# Patient Record
Sex: FEMALE | Race: WHITE | NOT HISPANIC OR LATINO | Employment: OTHER | RURAL
[De-identification: names, ages, dates, MRNs, and addresses within clinical notes are randomized per-mention and may not be internally consistent; named-entity substitution may affect disease eponyms.]

---

## 2020-08-11 ENCOUNTER — HISTORICAL (OUTPATIENT)
Dept: ADMINISTRATIVE | Facility: HOSPITAL | Age: 35
End: 2020-08-11

## 2020-08-17 LAB
MAYO GENERIC ORDERABLE RESULT: NORMAL
MAYO INTERP AND REPORT: NORMAL

## 2020-08-21 ENCOUNTER — HISTORICAL (OUTPATIENT)
Dept: ADMINISTRATIVE | Facility: HOSPITAL | Age: 35
End: 2020-08-21

## 2021-04-06 ENCOUNTER — OFFICE VISIT (OUTPATIENT)
Dept: NEUROLOGY | Facility: CLINIC | Age: 36
End: 2021-04-06
Payer: MEDICARE

## 2021-04-06 VITALS
DIASTOLIC BLOOD PRESSURE: 64 MMHG | HEART RATE: 84 BPM | SYSTOLIC BLOOD PRESSURE: 106 MMHG | WEIGHT: 148.81 LBS | HEIGHT: 63 IN | BODY MASS INDEX: 26.37 KG/M2 | OXYGEN SATURATION: 97 %

## 2021-04-06 DIAGNOSIS — G43.119 INTRACTABLE MIGRAINE WITH AURA WITHOUT STATUS MIGRAINOSUS: Primary | ICD-10-CM

## 2021-04-06 DIAGNOSIS — Z79.899 ON LONG TERM DRUG THERAPY: ICD-10-CM

## 2021-04-06 DIAGNOSIS — G44.40 REBOUND HEADACHE: ICD-10-CM

## 2021-04-06 DIAGNOSIS — R90.89 ABNORMAL MRA, BRAIN: ICD-10-CM

## 2021-04-06 DIAGNOSIS — G47.30 SLEEP APNEA, UNSPECIFIED TYPE: ICD-10-CM

## 2021-04-06 PROBLEM — E55.9 VITAMIN D DEFICIENCY: Status: ACTIVE | Noted: 2021-04-06

## 2021-04-06 PROCEDURE — 99999 PR PBB SHADOW E&M-EST. PATIENT-LVL V: CPT | Mod: PBBFAC,,, | Performed by: NURSE PRACTITIONER

## 2021-04-06 PROCEDURE — 99999 PR PBB SHADOW E&M-EST. PATIENT-LVL V: ICD-10-PCS | Mod: PBBFAC,,, | Performed by: NURSE PRACTITIONER

## 2021-04-06 PROCEDURE — 99215 OFFICE O/P EST HI 40 MIN: CPT | Mod: PBBFAC | Performed by: NURSE PRACTITIONER

## 2021-04-06 PROCEDURE — 99214 OFFICE O/P EST MOD 30 MIN: CPT | Mod: S$PBB,,, | Performed by: NURSE PRACTITIONER

## 2021-04-06 PROCEDURE — 99214 PR OFFICE/OUTPT VISIT, EST, LEVL IV, 30-39 MIN: ICD-10-PCS | Mod: S$PBB,,, | Performed by: NURSE PRACTITIONER

## 2021-04-06 RX ORDER — HYDROXYZINE HYDROCHLORIDE 25 MG/1
25 TABLET, FILM COATED ORAL 2 TIMES DAILY
COMMUNITY
Start: 2021-03-24 | End: 2021-09-07 | Stop reason: SDUPTHER

## 2021-04-06 RX ORDER — LAMOTRIGINE 25 MG/1
TABLET ORAL
COMMUNITY
Start: 2021-01-28 | End: 2021-04-06

## 2021-04-06 RX ORDER — PROPRANOLOL HYDROCHLORIDE 80 MG/1
80 CAPSULE, EXTENDED RELEASE ORAL DAILY
COMMUNITY
Start: 2021-03-24 | End: 2021-06-02

## 2021-04-06 RX ORDER — LURASIDONE HYDROCHLORIDE 60 MG/1
60 TABLET, FILM COATED ORAL NIGHTLY
COMMUNITY
Start: 2021-03-24 | End: 2021-06-02 | Stop reason: ALTCHOICE

## 2021-04-06 RX ORDER — PROMETHAZINE HYDROCHLORIDE 25 MG/1
25 TABLET ORAL
COMMUNITY
End: 2021-04-06 | Stop reason: SDUPTHER

## 2021-04-06 RX ORDER — PROMETHAZINE HYDROCHLORIDE 25 MG/1
TABLET ORAL
Qty: 30 TABLET | Refills: 2 | Status: SHIPPED | OUTPATIENT
Start: 2021-04-06 | End: 2022-01-26 | Stop reason: SDUPTHER

## 2021-04-06 RX ORDER — MONTELUKAST SODIUM 10 MG/1
10 TABLET ORAL DAILY
COMMUNITY
Start: 2021-03-24 | End: 2021-06-02 | Stop reason: SDUPTHER

## 2021-04-06 RX ORDER — METHOCARBAMOL 500 MG/1
250 TABLET, FILM COATED ORAL DAILY PRN
COMMUNITY
Start: 2021-04-01 | End: 2021-06-02 | Stop reason: SDUPTHER

## 2021-04-06 RX ORDER — ESTRADIOL VALERATE 20 MG/ML
20 INJECTION INTRAMUSCULAR
COMMUNITY
Start: 2021-01-06

## 2021-04-07 ENCOUNTER — TELEPHONE (OUTPATIENT)
Dept: NEUROLOGY | Facility: CLINIC | Age: 36
End: 2021-04-07

## 2021-04-15 ENCOUNTER — HOSPITAL ENCOUNTER (OUTPATIENT)
Dept: RADIOLOGY | Facility: HOSPITAL | Age: 36
Discharge: HOME | End: 2021-04-15
Attending: NURSE PRACTITIONER
Payer: MEDICARE

## 2021-04-15 DIAGNOSIS — G43.119 INTRACTABLE MIGRAINE WITH AURA WITHOUT STATUS MIGRAINOSUS: ICD-10-CM

## 2021-04-15 DIAGNOSIS — R90.89 ABNORMAL MRA, BRAIN: ICD-10-CM

## 2021-04-15 PROCEDURE — 70496 CT ANGIOGRAPHY HEAD: CPT | Mod: TC

## 2021-04-15 PROCEDURE — 70496 CT ANGIOGRAPHY HEAD: CPT | Mod: 26,,,

## 2021-04-15 PROCEDURE — 25500020 PHARM REV CODE 255: Performed by: NURSE PRACTITIONER

## 2021-04-15 PROCEDURE — 70496 CTA HEAD: ICD-10-PCS | Mod: 26,,,

## 2021-04-15 RX ADMIN — IOPAMIDOL 100 ML: 755 INJECTION, SOLUTION INTRAVENOUS at 10:04

## 2021-04-21 DIAGNOSIS — G47.8 OTHER SLEEP DISORDERS: ICD-10-CM

## 2021-04-21 DIAGNOSIS — G47.10 HYPERSOMNIA, UNSPECIFIED: ICD-10-CM

## 2021-04-21 DIAGNOSIS — G47.30 SLEEP APNEA, UNSPECIFIED: Primary | ICD-10-CM

## 2021-06-02 ENCOUNTER — OFFICE VISIT (OUTPATIENT)
Dept: FAMILY MEDICINE | Facility: CLINIC | Age: 36
End: 2021-06-02
Payer: MEDICARE

## 2021-06-02 VITALS
DIASTOLIC BLOOD PRESSURE: 72 MMHG | HEART RATE: 97 BPM | OXYGEN SATURATION: 99 % | SYSTOLIC BLOOD PRESSURE: 134 MMHG | BODY MASS INDEX: 25.69 KG/M2 | HEIGHT: 63 IN | RESPIRATION RATE: 20 BRPM | WEIGHT: 145 LBS

## 2021-06-02 DIAGNOSIS — M79.10 MUSCLE PAIN: ICD-10-CM

## 2021-06-02 DIAGNOSIS — M32.9 LUPUS: ICD-10-CM

## 2021-06-02 DIAGNOSIS — J30.9 ALLERGIC RHINITIS, UNSPECIFIED SEASONALITY, UNSPECIFIED TRIGGER: Primary | ICD-10-CM

## 2021-06-02 DIAGNOSIS — F31.9 BIPOLAR AFFECTIVE DISORDER, REMISSION STATUS UNSPECIFIED: ICD-10-CM

## 2021-06-02 PROCEDURE — 86038 ANTINUCLEAR ANTIBODIES: CPT | Mod: ,,, | Performed by: CLINICAL MEDICAL LABORATORY

## 2021-06-02 PROCEDURE — 99214 OFFICE O/P EST MOD 30 MIN: CPT | Mod: ,,, | Performed by: FAMILY MEDICINE

## 2021-06-02 PROCEDURE — 86038 ANA EIA W/REFLEX DSDNA/ENA: ICD-10-PCS | Mod: ,,, | Performed by: CLINICAL MEDICAL LABORATORY

## 2021-06-02 PROCEDURE — 99214 PR OFFICE/OUTPT VISIT, EST, LEVL IV, 30-39 MIN: ICD-10-PCS | Mod: ,,, | Performed by: FAMILY MEDICINE

## 2021-06-02 RX ORDER — VORTIOXETINE 20 MG/1
TABLET, FILM COATED ORAL
COMMUNITY
Start: 2021-04-26 | End: 2021-06-02

## 2021-06-02 RX ORDER — MONTELUKAST SODIUM 10 MG/1
10 TABLET ORAL DAILY
Qty: 30 TABLET | Refills: 3 | Status: SHIPPED | OUTPATIENT
Start: 2021-06-02 | End: 2021-06-09 | Stop reason: SDUPTHER

## 2021-06-02 RX ORDER — METHOCARBAMOL 500 MG/1
500 TABLET, FILM COATED ORAL DAILY PRN
Qty: 30 TABLET | Refills: 3 | Status: SHIPPED | OUTPATIENT
Start: 2021-06-02 | End: 2021-06-09 | Stop reason: SDUPTHER

## 2021-06-02 RX ORDER — QUETIAPINE FUMARATE 50 MG/1
50 TABLET, FILM COATED ORAL 2 TIMES DAILY
Qty: 60 TABLET | Refills: 2 | Status: SHIPPED | OUTPATIENT
Start: 2021-06-02 | End: 2021-06-09 | Stop reason: SDUPTHER

## 2021-06-03 LAB — ANA SER QL: NEGATIVE

## 2021-07-29 ENCOUNTER — OFFICE VISIT (OUTPATIENT)
Dept: FAMILY MEDICINE | Facility: CLINIC | Age: 36
End: 2021-07-29
Payer: MEDICARE

## 2021-07-29 VITALS
WEIGHT: 145 LBS | BODY MASS INDEX: 25.69 KG/M2 | RESPIRATION RATE: 16 BRPM | HEIGHT: 63 IN | SYSTOLIC BLOOD PRESSURE: 113 MMHG | DIASTOLIC BLOOD PRESSURE: 87 MMHG | HEART RATE: 77 BPM

## 2021-07-29 DIAGNOSIS — M32.9 LUPUS: ICD-10-CM

## 2021-07-29 DIAGNOSIS — F41.9 ANXIETY: ICD-10-CM

## 2021-07-29 DIAGNOSIS — F31.70 BIPOLAR AFFECTIVE DISORDER IN REMISSION: ICD-10-CM

## 2021-07-29 DIAGNOSIS — Z23 NEED FOR PROPHYLACTIC VACCINATION WITH COMBINED DIPHTHERIA-TETANUS-PERTUSSIS (DTP) VACCINE: ICD-10-CM

## 2021-07-29 DIAGNOSIS — Z11.4 SCREENING FOR HUMAN IMMUNODEFICIENCY VIRUS: ICD-10-CM

## 2021-07-29 DIAGNOSIS — Z00.00 ENCOUNTER FOR SUBSEQUENT ANNUAL WELLNESS VISIT (AWV) IN MEDICARE PATIENT: Primary | ICD-10-CM

## 2021-07-29 DIAGNOSIS — Z23 NEED FOR VACCINATION FOR DTP: ICD-10-CM

## 2021-07-29 PROBLEM — I67.1 INTRACRANIAL ANEURYSM: Status: ACTIVE | Noted: 2021-05-12

## 2021-07-29 LAB — HIV 1+O+2 AB SERPL QL: NORMAL

## 2021-07-29 PROCEDURE — 1158F PR ADVANCE CARE PLANNING DISCUSS DOCUMENTED IN MEDICAL RECORD: ICD-10-PCS | Mod: ,,, | Performed by: NURSE PRACTITIONER

## 2021-07-29 PROCEDURE — 90471 TDAP VACCINE GREATER THAN OR EQUAL TO 7YO IM: ICD-10-PCS | Mod: ,,, | Performed by: NURSE PRACTITIONER

## 2021-07-29 PROCEDURE — 90471 IMMUNIZATION ADMIN: CPT | Mod: ,,, | Performed by: NURSE PRACTITIONER

## 2021-07-29 PROCEDURE — 87389 HIV 1 / 2 ANTIBODY: ICD-10-PCS | Mod: ,,, | Performed by: CLINICAL MEDICAL LABORATORY

## 2021-07-29 PROCEDURE — G0438 PR WELCOME MEDICARE ANNUAL WELLNESS INITIAL VISIT: ICD-10-PCS | Mod: ,,, | Performed by: NURSE PRACTITIONER

## 2021-07-29 PROCEDURE — 90715 TDAP VACCINE 7 YRS/> IM: CPT | Mod: ,,, | Performed by: NURSE PRACTITIONER

## 2021-07-29 PROCEDURE — G0438 PPPS, INITIAL VISIT: HCPCS | Mod: ,,, | Performed by: NURSE PRACTITIONER

## 2021-07-29 PROCEDURE — 1158F ADVNC CARE PLAN TLK DOCD: CPT | Mod: ,,, | Performed by: NURSE PRACTITIONER

## 2021-07-29 PROCEDURE — 90715 TDAP VACCINE GREATER THAN OR EQUAL TO 7YO IM: ICD-10-PCS | Mod: ,,, | Performed by: NURSE PRACTITIONER

## 2021-07-29 PROCEDURE — 87389 HIV-1 AG W/HIV-1&-2 AB AG IA: CPT | Mod: ,,, | Performed by: CLINICAL MEDICAL LABORATORY

## 2021-07-29 PROCEDURE — 1034F CURRENT TOBACCO SMOKER: CPT | Mod: ,,, | Performed by: NURSE PRACTITIONER

## 2021-07-29 PROCEDURE — 1034F PR CURRENT TOBACCO SMOKER: ICD-10-PCS | Mod: ,,, | Performed by: NURSE PRACTITIONER

## 2021-07-29 RX ORDER — PROPRANOLOL HYDROCHLORIDE 80 MG/1
1 CAPSULE, EXTENDED RELEASE ORAL DAILY
COMMUNITY
Start: 2021-07-27 | End: 2021-09-07

## 2021-07-29 RX ORDER — LURASIDONE HYDROCHLORIDE 60 MG/1
1 TABLET, FILM COATED ORAL DAILY
COMMUNITY
Start: 2021-06-26 | End: 2021-09-07

## 2021-09-07 ENCOUNTER — OFFICE VISIT (OUTPATIENT)
Dept: FAMILY MEDICINE | Facility: CLINIC | Age: 36
End: 2021-09-07
Payer: MEDICARE

## 2021-09-07 VITALS
HEART RATE: 84 BPM | SYSTOLIC BLOOD PRESSURE: 116 MMHG | DIASTOLIC BLOOD PRESSURE: 80 MMHG | HEIGHT: 63 IN | WEIGHT: 141 LBS | OXYGEN SATURATION: 99 % | BODY MASS INDEX: 24.98 KG/M2

## 2021-09-07 DIAGNOSIS — J30.9 ALLERGIC RHINITIS, UNSPECIFIED SEASONALITY, UNSPECIFIED TRIGGER: ICD-10-CM

## 2021-09-07 DIAGNOSIS — G47.00 INSOMNIA, UNSPECIFIED TYPE: ICD-10-CM

## 2021-09-07 DIAGNOSIS — F31.70 BIPOLAR AFFECTIVE DISORDER IN REMISSION: Primary | ICD-10-CM

## 2021-09-07 DIAGNOSIS — F41.9 ANXIETY: ICD-10-CM

## 2021-09-07 DIAGNOSIS — M79.10 MUSCLE PAIN: ICD-10-CM

## 2021-09-07 PROCEDURE — 99214 PR OFFICE/OUTPT VISIT, EST, LEVL IV, 30-39 MIN: ICD-10-PCS | Mod: ,,, | Performed by: FAMILY MEDICINE

## 2021-09-07 PROCEDURE — 99214 OFFICE O/P EST MOD 30 MIN: CPT | Mod: ,,, | Performed by: FAMILY MEDICINE

## 2021-09-07 RX ORDER — CARIPRAZINE 1.5 MG/1
1.5 CAPSULE, GELATIN COATED ORAL DAILY
Qty: 30 CAPSULE | Refills: 0 | Status: SHIPPED | OUTPATIENT
Start: 2021-09-07 | End: 2021-10-12 | Stop reason: SDUPTHER

## 2021-09-07 RX ORDER — MONTELUKAST SODIUM 10 MG/1
10 TABLET ORAL DAILY
Qty: 30 TABLET | Refills: 3 | Status: SHIPPED | OUTPATIENT
Start: 2021-09-07 | End: 2022-03-01 | Stop reason: SDUPTHER

## 2021-09-07 RX ORDER — TRAZODONE HYDROCHLORIDE 50 MG/1
50 TABLET ORAL NIGHTLY
Qty: 30 TABLET | Refills: 3 | Status: SHIPPED | OUTPATIENT
Start: 2021-09-07 | End: 2021-12-01 | Stop reason: SDUPTHER

## 2021-09-07 RX ORDER — METHOCARBAMOL 500 MG/1
500 TABLET, FILM COATED ORAL DAILY PRN
Qty: 30 TABLET | Refills: 3 | Status: SHIPPED | OUTPATIENT
Start: 2021-09-07 | End: 2021-12-01 | Stop reason: SDUPTHER

## 2021-09-07 RX ORDER — HYDROXYZINE HYDROCHLORIDE 25 MG/1
25 TABLET, FILM COATED ORAL 2 TIMES DAILY
Qty: 60 TABLET | Refills: 3 | Status: SHIPPED | OUTPATIENT
Start: 2021-09-07 | End: 2022-01-26

## 2021-09-07 RX ORDER — OMEPRAZOLE 20 MG/1
CAPSULE, DELAYED RELEASE ORAL
COMMUNITY
Start: 2021-08-17 | End: 2021-12-01

## 2021-09-07 RX ORDER — MELOXICAM 15 MG/1
TABLET ORAL
COMMUNITY
Start: 2021-08-17 | End: 2022-01-26

## 2021-10-12 ENCOUNTER — OFFICE VISIT (OUTPATIENT)
Dept: FAMILY MEDICINE | Facility: CLINIC | Age: 36
End: 2021-10-12
Payer: MEDICARE

## 2021-10-12 VITALS
RESPIRATION RATE: 16 BRPM | HEIGHT: 63 IN | SYSTOLIC BLOOD PRESSURE: 118 MMHG | WEIGHT: 149 LBS | OXYGEN SATURATION: 98 % | DIASTOLIC BLOOD PRESSURE: 80 MMHG | HEART RATE: 86 BPM | BODY MASS INDEX: 26.4 KG/M2

## 2021-10-12 DIAGNOSIS — F31.70 BIPOLAR AFFECTIVE DISORDER IN REMISSION: ICD-10-CM

## 2021-10-12 PROCEDURE — 99213 PR OFFICE/OUTPT VISIT, EST, LEVL III, 20-29 MIN: ICD-10-PCS | Mod: ,,, | Performed by: FAMILY MEDICINE

## 2021-10-12 PROCEDURE — 99213 OFFICE O/P EST LOW 20 MIN: CPT | Mod: ,,, | Performed by: FAMILY MEDICINE

## 2021-10-12 RX ORDER — CARIPRAZINE 1.5 MG/1
1.5 CAPSULE, GELATIN COATED ORAL DAILY
Qty: 30 CAPSULE | Refills: 3 | Status: SHIPPED | OUTPATIENT
Start: 2021-10-12 | End: 2021-12-01 | Stop reason: SDUPTHER

## 2021-12-01 ENCOUNTER — OFFICE VISIT (OUTPATIENT)
Dept: FAMILY MEDICINE | Facility: CLINIC | Age: 36
End: 2021-12-01
Payer: MEDICARE

## 2021-12-01 VITALS
TEMPERATURE: 99 F | RESPIRATION RATE: 15 BRPM | WEIGHT: 150 LBS | HEART RATE: 83 BPM | OXYGEN SATURATION: 98 % | HEIGHT: 63 IN | DIASTOLIC BLOOD PRESSURE: 78 MMHG | BODY MASS INDEX: 26.58 KG/M2 | SYSTOLIC BLOOD PRESSURE: 122 MMHG

## 2021-12-01 DIAGNOSIS — J30.9 ALLERGIC RHINITIS, UNSPECIFIED SEASONALITY, UNSPECIFIED TRIGGER: ICD-10-CM

## 2021-12-01 DIAGNOSIS — M79.10 MUSCLE PAIN: ICD-10-CM

## 2021-12-01 DIAGNOSIS — G47.00 INSOMNIA, UNSPECIFIED TYPE: ICD-10-CM

## 2021-12-01 DIAGNOSIS — F31.70 BIPOLAR AFFECTIVE DISORDER IN REMISSION: Primary | ICD-10-CM

## 2021-12-01 PROCEDURE — 99214 PR OFFICE/OUTPT VISIT, EST, LEVL IV, 30-39 MIN: ICD-10-PCS | Mod: ,,, | Performed by: FAMILY MEDICINE

## 2021-12-01 PROCEDURE — 99214 OFFICE O/P EST MOD 30 MIN: CPT | Mod: ,,, | Performed by: FAMILY MEDICINE

## 2021-12-01 RX ORDER — MELOXICAM 15 MG/1
TABLET ORAL
COMMUNITY
Start: 2021-08-17 | End: 2022-01-26

## 2021-12-01 RX ORDER — QUETIAPINE FUMARATE 50 MG/1
50 TABLET, FILM COATED ORAL NIGHTLY
Qty: 30 TABLET | Refills: 3 | Status: SHIPPED | OUTPATIENT
Start: 2021-12-01 | End: 2021-12-01 | Stop reason: ALTCHOICE

## 2021-12-01 RX ORDER — MONTELUKAST SODIUM 10 MG/1
TABLET ORAL
COMMUNITY
End: 2021-12-01 | Stop reason: SDUPTHER

## 2021-12-01 RX ORDER — OMEPRAZOLE 20 MG/1
CAPSULE, DELAYED RELEASE ORAL
COMMUNITY
Start: 2021-08-17 | End: 2021-12-01 | Stop reason: SDUPTHER

## 2021-12-01 RX ORDER — GALCANEZUMAB 120 MG/ML
INJECTION, SOLUTION SUBCUTANEOUS
COMMUNITY
End: 2022-01-26 | Stop reason: SDUPTHER

## 2021-12-01 RX ORDER — PROPRANOLOL HYDROCHLORIDE 80 MG/1
TABLET ORAL
COMMUNITY
End: 2021-12-01 | Stop reason: ALTCHOICE

## 2021-12-01 RX ORDER — TRAZODONE HYDROCHLORIDE 50 MG/1
50 TABLET ORAL NIGHTLY
Qty: 30 TABLET | Refills: 3 | Status: SHIPPED | OUTPATIENT
Start: 2021-12-01 | End: 2022-03-01 | Stop reason: SDUPTHER

## 2021-12-01 RX ORDER — METHOCARBAMOL 500 MG/1
500 TABLET, FILM COATED ORAL DAILY PRN
Qty: 30 TABLET | Refills: 3 | Status: SHIPPED | OUTPATIENT
Start: 2021-12-01 | End: 2022-06-01

## 2021-12-01 RX ORDER — CARIPRAZINE 1.5 MG/1
1.5 CAPSULE, GELATIN COATED ORAL DAILY
Qty: 30 CAPSULE | Refills: 3 | Status: SHIPPED | OUTPATIENT
Start: 2021-12-01 | End: 2022-03-01 | Stop reason: SDUPTHER

## 2021-12-01 RX ORDER — GABAPENTIN 100 MG/1
CAPSULE ORAL
COMMUNITY
Start: 2021-11-22 | End: 2023-03-02

## 2021-12-01 RX ORDER — QUETIAPINE FUMARATE 50 MG/1
TABLET, FILM COATED ORAL
COMMUNITY
Start: 2021-11-22 | End: 2021-12-01 | Stop reason: SDUPTHER

## 2021-12-01 RX ORDER — HYDROXYZINE HYDROCHLORIDE 25 MG/ML
INJECTION, SOLUTION INTRAMUSCULAR
COMMUNITY
End: 2022-01-26

## 2021-12-01 RX ORDER — OMEPRAZOLE 20 MG/1
20 CAPSULE, DELAYED RELEASE ORAL DAILY
COMMUNITY
End: 2022-01-26
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
## 2022-01-26 ENCOUNTER — OFFICE VISIT (OUTPATIENT)
Dept: NEUROLOGY | Facility: CLINIC | Age: 37
End: 2022-01-26
Payer: MEDICARE

## 2022-01-26 VITALS
HEART RATE: 97 BPM | HEIGHT: 63 IN | OXYGEN SATURATION: 98 % | BODY MASS INDEX: 28.67 KG/M2 | DIASTOLIC BLOOD PRESSURE: 74 MMHG | SYSTOLIC BLOOD PRESSURE: 116 MMHG | WEIGHT: 161.81 LBS

## 2022-01-26 DIAGNOSIS — E55.9 VITAMIN D DEFICIENCY: ICD-10-CM

## 2022-01-26 DIAGNOSIS — I67.1 INTRACRANIAL ANEURYSM: Chronic | ICD-10-CM

## 2022-01-26 DIAGNOSIS — G47.30 SLEEP APNEA, UNSPECIFIED TYPE: ICD-10-CM

## 2022-01-26 DIAGNOSIS — G43.119 INTRACTABLE MIGRAINE WITH AURA WITHOUT STATUS MIGRAINOSUS: Primary | Chronic | ICD-10-CM

## 2022-01-26 PROBLEM — M19.90 OSTEOARTHRITIS: Status: ACTIVE | Noted: 2022-01-26

## 2022-01-26 PROBLEM — M32.9 SYSTEMIC LUPUS ERYTHEMATOSUS: Status: ACTIVE | Noted: 2022-01-26

## 2022-01-26 PROBLEM — E66.3 OVERWEIGHT: Status: ACTIVE | Noted: 2022-01-26

## 2022-01-26 PROBLEM — M25.562 PAIN IN LEFT KNEE: Status: ACTIVE | Noted: 2022-01-26

## 2022-01-26 PROBLEM — A83.3: Status: ACTIVE | Noted: 2022-01-26

## 2022-01-26 PROBLEM — M79.7 FIBROMYALGIA: Status: ACTIVE | Noted: 2022-01-26

## 2022-01-26 PROCEDURE — 99214 PR OFFICE/OUTPT VISIT, EST, LEVL IV, 30-39 MIN: ICD-10-PCS | Mod: S$PBB,,, | Performed by: NURSE PRACTITIONER

## 2022-01-26 PROCEDURE — 99214 OFFICE O/P EST MOD 30 MIN: CPT | Mod: PBBFAC | Performed by: NURSE PRACTITIONER

## 2022-01-26 PROCEDURE — 99214 OFFICE O/P EST MOD 30 MIN: CPT | Mod: S$PBB,,, | Performed by: NURSE PRACTITIONER

## 2022-01-26 RX ORDER — HYDROXYZINE PAMOATE 25 MG/1
CAPSULE ORAL
COMMUNITY
Start: 2022-01-17 | End: 2022-03-01 | Stop reason: SDUPTHER

## 2022-01-26 RX ORDER — PROGESTERONE 200 MG/1
CAPSULE ORAL
COMMUNITY
Start: 2022-01-17 | End: 2023-03-02

## 2022-01-26 RX ORDER — PROMETHAZINE HYDROCHLORIDE 25 MG/1
TABLET ORAL
Qty: 30 TABLET | Refills: 5 | Status: SHIPPED | OUTPATIENT
Start: 2022-01-26 | End: 2022-07-26 | Stop reason: SDUPTHER

## 2022-01-26 NOTE — PATIENT INSTRUCTIONS
"Patient Education       Brain Aneurysm   The Basics   Written by the doctors and editors at [facility]   What is a brain aneurysm? -- A brain aneurysm is a bulge or "ballooning" in the wall of a blood vessel in the brain (figure 1). A small aneurysm often causes no symptoms. But if it grows, an aneurysm can press on nerves inside your brain, causing pain and other symptoms. It can also leak blood or burst, causing a sudden, severe headache.  A burst aneurysm is a very serious, life-threatening emergency that needs to be treated right away. It leads to a bleeding (hemorrhagic) stroke. Stroke is the term doctors use when a part of the brain is damaged because of a problem with blood flow. The stroke that happens after a brain aneurysm bursts is called a "subarachnoid hemorrhage."  What are the symptoms of a brain aneurysm? -- Most small brain aneurysms cause no symptoms and are found during an imaging test (which a doctor does to look at pictures of a person's brain). For example, a person might get an imaging test because a family member had an aneurysm, or for an unrelated reason. But a large aneurysm can cause symptoms that include:  · A bad headache  · Pain in the face  · Blurry or double vision  If the aneurysm bursts, symptoms can include:   · A sudden, severe headache - People often say the headache is the worst they have ever had.  · A stiff neck  · Nausea and vomiting  · Passing out  Are there tests for a brain aneurysm? -- Yes. If you have symptoms of a brain aneurysm, or if you are at risk of having one, your doctor might do several different tests, including:  · Imaging tests - These tests include a CT scan and an MRI. Both show pictures of your brain. During these tests, you might also get an injection of a dye that makes it easier for doctors to see blood flow in the brain.  · Lumbar puncture (sometimes called a "spinal tap") - During this procedure, a doctor puts a needle into your lower back and takes " out a small sample of spinal fluid. Spinal fluid is the fluid that surrounds the brain and spinal cord. If this fluid has more red blood cells than usual, you could have a subarachnoid hemorrhage.  · Cerebral angiogram - For this test, your doctor puts a thin, plastic tube into a large blood vessel, usually near the top of your thigh. Then they will advance the tube through your blood vessels past your heart to your brain. Your doctor then injects dye that shows up on an X-ray. This lets the doctor see the blood vessels in your brain and find the aneurysm.  How is a brain aneurysm treated? -- There are 2 main ways to treat a brain aneurysm:  · Endovascular coiling - The first part of this treatment is like a cerebral angiogram (see above). But instead of injecting dye into the tube, your doctor advances a soft wire up through your blood vessels into the aneurysm. The wire coils up inside the aneurysm and seals it off from the blood vessel.  · Surgical clipping - In this surgery, your doctor puts a tiny metal clip on the base of the aneurysm to stop the blood flow to it.  If your aneurysm is leaking or bursts, your doctor will do one of these treatments right away. If it has not burst, you might not be treated. The decision depends on the size of the aneurysm, your age, and whether you have other health problems.   Whether or not your aneurysm is treated, your doctor will likely do more imaging tests over time. This is to make sure the aneurysm isn't growing or coming back, and that new aneurysms aren't forming.  Are there things that make people more likely to have a brain aneurysm? -- Yes. Having a parent, brother, or sister who had an aneurysm makes you more likely to have one. People with certain medical conditions also have a higher risk of aneurysm than other people.  Other things that can raise your chances include:  · Having high blood pressure  · Smoking cigarettes  · Drinking too much alcohol  · Using  "illegal drugs, such as cocaine or amphetamines  Avoiding these things might help lower your chance of getting an aneurysm.  All topics are updated as new evidence becomes available and our peer review process is complete.  This topic retrieved from [employer] on: Sep 21, 2021.  Topic 83255 Version 8.0  Release: 29.4.2 - C29.263  © 2021 UpToDate, Inc. and/or its affiliates. All rights reserved.  figure 1: Brain aneurysm     A brain aneurysm is a swollen bulge or "ballooning" in the wall of a blood vessel in the brain.  Graphic 97083 Version 2.0    Consumer Information Use and Disclaimer   This information is not specific medical advice and does not replace information you receive from your health care provider. This is only a brief summary of general information. It does NOT include all information about conditions, illnesses, injuries, tests, procedures, treatments, therapies, discharge instructions or life-style choices that may apply to you. You must talk with your health care provider for complete information about your health and treatment options. This information should not be used to decide whether or not to accept your health care provider's advice, instructions or recommendations. Only your health care provider has the knowledge and training to provide advice that is right for you. The use of this information is governed by the [employer] End User License Agreement, available at https://www.[employer]/en/solutions/[employer]/about/rosangela.The use of [employer] content is governed by the [employer] Terms of Use. ©2021 UpToDate, Inc. All rights reserved.  Copyright   © 2021 UpToDate, Inc. and/or its affiliates. All rights reserved.  Patient Education       Migraines Discharge Instructions   About this topic   A migraine is a specific type of headache. All headaches are not migraines. A migraine is caused by abnormal brain activity. It may be due to widening or narrowing of the blood vessels in the head. It may last " for a couple of hours or even a few days.  There are two types of migraine headaches:  · Classic migraine or migraine with aura ? It often starts with some problems in your eyesight called auras. You may see spots, dots, or even zigzag lines. Some people will lose part of their vision. An aura is the signal that a migraine is coming. It is followed by a very bad headache on one side of the head. Noise, light, and other activities can make it worse. Sometimes, it comes with upset stomach and throwing up.  · Common migraine or migraine without aura ? This migraine happens without the signal that the headache is coming.  Migraines can be treated with different drugs. Some drugs treat the pain. Other drugs stop the brain activity that causes the migraine. If migraines happen often, drugs that prevent migraines can help. Migraines can be helped by sleep. Stress control like exercise, relaxation, and a regular routine of good nutrition and sleep are all helpful in preventing migraines. Lifestyle and dietary changes may also help you deal with a migraine.         What care is needed at home?   · Ask your doctor what you need to do when you go home. Make sure you ask questions if you do not understand what the doctor says. This way you will know what you need to do.  · Your doctor may give you drugs to help with the pain. Take them as ordered by your doctor.  · Your doctor may teach you some ways to help control your migraine like relaxation and exercises.  · Keep a diary about your headaches. Write down when your headache happens. Write down what you were doing before it happened. Write down any foods or drinks that you had in the last day. This will help you learn what might be causing your headaches. Then, you can learn how to avoid them.  · Place an ice pack or a bag of frozen peas wrapped in a towel over your head. Never put ice right on the skin. Do not leave the ice on more than 10 to 15 minutes at a time.  · Using  heat may also help. Try using a heating pad on the back of your neck. A warm shower or bath may also relax tight muscles.  · Avoid bright lights and noise. Rest in a quiet, dark room. Sleep may also help.  · Drink a caffeinated beverage. Be careful to not drink too much caffeine as this can cause other headaches.  · Do not make any big decisions until your migraine goes away.  · If certain drugs your doctor ordered trigger your migraine, your doctor may tell you to stop taking those drugs.  What follow-up care is needed?   · Your doctor may ask you to make visits to the office to check on your progress. Be sure to keep these visits.  · Your doctor may send you to a specialist called a neurologist.  · Your doctor may request that you get a brain MRI.  What drugs may be needed?   The doctor may order drugs to:  · Help with pain  · Relieve the migraine  · Prevent a migraine attack  · Treat an upset stomach and throwing up  · Treat a hormonal problem  Will physical activity be limited?   · Regular exercise can help prevent migraines. If exercise triggers your migraine, talk to your doctor.  · Do not drive or run machinery until your migraine goes away.  What changes to diet are needed?   · Do not skip or delay meals. Drink 6 to 8 glasses of water each day. This may help prevent migraines.  · Avoid foods that may trigger the attack. These may include processed, fermented, pickled, or marinated foods. Some of these are baked goods, chocolate, dairy products, nuts, onions, and peanut butter. Others are fruits like avocado, banana, or citrus fruit and meats like carroll, hot dogs, and cured meats.  · Limit caffeine intake. You will find that caffeine may help relieve pain. But, too much caffeine may also trigger an attack.  · Avoid drinking beer, wine, and mixed drinks (alcohol) if you think this is causing your migraines.  · Quit smoking. Smoking can worsen your migraine.  What problems could happen?   · Loss of work time or  missing school if migraines come often  · Low mood, worry  · Poor quality of life  · Migraines can slightly raise your chances of having a stroke   What can be done to prevent this health problem?   · Some drugs may help prevent migraines. Talk to your doctor about the drugs you may need to take.  · Keep a sleeping routine. Go to sleep and get up the same time every day.  · Be active. Exercise can reduce your risk of having migraines.  · Take note of the things that may trigger your migraine. Learning what they are is very important to help avoid an attack.  · Try to keep stress in your life low. Try relaxation exercises or meditation to lower stress.  When do I need to call the doctor?   · Signs of a bad reaction. These include very bad headache beyond the usual; speech, vision, motion problems or loss of balance; headaches are worse when lying down or headaches suddenly starts. Call for emergency help right away.  · Changes in migraine attacks  · Migraines that happen more often than 3 times a month  · You are not feeling better in 2 to 3 days or you are feeling worse  Teach Back: Helping You Understand   The Teach Back Method helps you understand the information we are giving you. After you talk with the staff, tell them in your own words what you learned. This helps to make sure the staff has described each thing clearly. It also helps to explain things that may have been confusing. Before going home, make sure you can do these:  · I can tell you about my condition.  · I can tell you what may help ease my pain.  · I can tell you what I will do if there is a change in my headaches.  Where can I learn more?   American Academy of Pediatrics  https://www.healthychildren.org/English/health-issues/conditions/head-neck-nervous-system/Pages/Migraine-Headaches-in-Children.aspx   NHS Choices  http://www.nhs.uk/Conditions/Migraine/Pages/Causes.aspx   Last Reviewed Date   2020-05-12  Consumer Information Use and Disclaimer  "  This information is not specific medical advice and does not replace information you receive from your health care provider. This is only a brief summary of general information. It does NOT include all information about conditions, illnesses, injuries, tests, procedures, treatments, therapies, discharge instructions or life-style choices that may apply to you. You must talk with your health care provider for complete information about your health and treatment options. This information should not be used to decide whether or not to accept your health care providers advice, instructions or recommendations. Only your health care provider has the knowledge and training to provide advice that is right for you.  Copyright   Copyright © 2021 UpToDate, Inc. and its affiliates and/or licensors. All rights reserved.  Patient Education       Migraines in Adults   The Basics   Written by the doctors and editors at [employer]   What are migraines? -- Migraines are a kind of headache that can also involve other symptoms. Migraines can affect both adults and children. They are more common in women than in men. Migraines often start off mild and then get worse.  What are the symptoms of migraines in adults? -- Symptoms can include:  · Headache - The headache gets worse over several hours and is usually throbbing. It often affects 1 side of the head.  · Nausea and sometimes vomiting  · Feeling sensitive to light and noise - Lying down in a quiet, dark room often helps.  · Aura - Some people have something called a migraine "aura." An aura is a symptom or feeling that happens before or during the migraine headache. Each person's aura is different, but in most cases the aura affects the vision. You might see flashing lights, bright spots, or zig-zag lines, or lose part of your vision. Or you might have numbness and tingling of the lips, lower face, and fingers of 1 hand. Some people hear sounds or have ringing in their ears as part " "of their aura. The aura usually lasts a few minutes to an hour and then goes away, but most often lasts 15 to 30 minutes.  Women who get migraines with aura usually cannot take birth control pills. That's because they might increase the risk of stroke.  Many people get other symptoms of migraine that happen several hours or even a day before the headache. Doctors call these "premonitory" or "prodromal" symptoms. They might include yawning, feeling depressed, irritability, food cravings, constipation, or a stiff neck.  Is there a test for migraines? -- No. There is no test. But your doctor should be able to tell if you have migraines by doing an exam and learning about your symptoms.  Should I see a doctor or nurse? -- Yes. If you think you are having migraines, you should talk to your doctor or nurse. You should also see a doctor or nurse if your migraines get worse or more frequent, or if you have new symptoms.  Is there anything I can do to prevent migraines? -- Yes. Some people find that their migraines are triggered by certain things. If you can avoid some of these things, you can lower your chances of getting migraines.  You can also keep a "headache calendar." In the calendar, write down every time you have a migraine and what you ate and did before it started. That way you can find out if there is anything you should avoid eating or doing. You can also write down what medicine you took and whether or not it helped.  Common migraine triggers include:  · Stress  · Hormonal changes  · Skipping meals or not eating enough  · Changes in the weather  · Sleeping too much or too little  · Bright or flashing lights  · Drinking alcohol  · Certain drinks or foods, such as red wine, aged cheese, and hot dogs  If your migraines are frequent or severe, your doctor can suggest others ways to help prevent them. For example, it might help to learn relaxation techniques and ways to manage stress. There are also medicines that " can help.  Some women get migraines just before or during their period. Medicine can help with this, too.  How are migraines treated? -- There are many different medicines that can help with migraines. Your doctor can help you find the best treatment for your situation.  For mild migraines, your doctor might suggest an over-the-counter medicine such as acetaminophen (sample brand name: Tylenol), ibuprofen (sample brand names: Advil, Motrin), or naproxen (sample brand name: Aleve). There is also a medicine that combines acetaminophen, aspirin, and caffeine (sample brand name: Excedrin).  For more severe migraines, there are prescription medicines that can help. Some, such as medicines called triptans, help to relieve the pain from a migraine attack. Other prescription medicines can help to make migraine attacks happen less often. If you have severe nausea or vomiting with your migraines, there are medicines that can help with that, too.   Do not try to treat frequent migraines on your own with non-prescription pain medicines. Taking non-prescription pain medicines too often can actually cause more headaches later.  What if I want to get pregnant? -- If you want to get pregnant, talk to your doctor or nurse about it before you start trying. Some medicines used to treat and prevent migraines are not safe during pregnancy, so you might need to switch medicines before you get pregnant.  Some women notice that their migraines actually get better during pregnancy and breastfeeding. This is related to hormonal changes in the body.  All topics are updated as new evidence becomes available and our peer review process is complete.  This topic retrieved from [employer] on: Sep 21, 2021.  Topic 179161 Version 5.0  Release: 29.4.2 - C29.263  © 2021 UpToDate, Inc. and/or its affiliates. All rights reserved.  Consumer Information Use and Disclaimer   This information is not specific medical advice and does not replace information you  "receive from your health care provider. This is only a brief summary of general information. It does NOT include all information about conditions, illnesses, injuries, tests, procedures, treatments, therapies, discharge instructions or life-style choices that may apply to you. You must talk with your health care provider for complete information about your health and treatment options. This information should not be used to decide whether or not to accept your health care provider's advice, instructions or recommendations. Only your health care provider has the knowledge and training to provide advice that is right for you. The use of this information is governed by the [employer] End User License Agreement, available at https://www.[employer]/en/solutions/[employer]/about/rosangela.The use of [employer] content is governed by the [employer] Terms of Use. ©2021 UpToDate, Inc. All rights reserved.  Copyright   © 2021 UpToDate, Inc. and/or its affiliates. All rights reserved.  Patient Education       Sleep Apnea in Adults   The Basics   Written by the doctors and editors at [email or web address]   What is sleep apnea? -- Sleep apnea is a condition that makes you stop breathing for short periods while you are asleep. There are 2 types of sleep apnea. One is called "obstructive sleep apnea," and the other is called "central sleep apnea."  In obstructive sleep apnea, you stop breathing because your throat narrows or closes (figure 1). In central sleep apnea, you stop breathing because your brain does not send the right signals to your muscles to make you breathe. When people talk about sleep apnea, they are usually referring to obstructive sleep apnea, which is what this article is about.  People with sleep apnea do not know that they stop breathing when they are asleep. But they do sometimes wake up startled or gasping for breath. They also often hear from loved ones that they snore.  What are the symptoms of sleep apnea? -- The main " "symptoms of sleep apnea are loud snoring, tiredness, and daytime sleepiness. Other symptoms can include:  · Restless sleep  · Waking up choking or gasping  · Morning headaches, dry mouth, or sore throat  · Waking up often to urinate  · Waking up feeling unrested or groggy  · Trouble thinking clearly or remembering things  Some people with sleep apnea don't have symptoms, or they don't know they have them. They might figure that it's normal to be tired or to snore a lot.  Should I see a doctor or nurse? -- Yes. If you think you might have sleep apnea, see your doctor.  Is there a test for sleep apnea? -- Yes. If your doctor or nurse suspects you have sleep apnea, they might send you for a "sleep study." Sleep studies can sometimes be done at home, but they are usually done in a sleep lab. For the study, you spend the night in the lab, and you are hooked up to different machines that monitor your heart rate, breathing, and other body functions. The results of the test will tell your doctor or nurse if you have the disorder.  Is there anything I can do on my own to help my sleep apnea? -- Yes. Here are some things that might help:  · Stay off your back when sleeping. (This is not always practical, because people cannot control their position while asleep. Plus, it only helps some people.)  · Lose weight, if you are overweight  · Avoid alcohol, because it can make sleep apnea worse  How is sleep apnea treated? -- As mentioned above, weight loss can help if you are overweight or obese. But losing weight can be challenging, and it takes time to lose enough weight to help with your sleep apnea. Most people need other treatment while they work on losing weight.  The most effective treatment for sleep apnea is a device that keeps your airway open while you sleep. Treatment with this device is called "continuous positive airway pressure," or CPAP. People getting CPAP wear a face mask at night that keeps them breathing (figure " "2).  If your doctor or nurse recommends a CPAP machine, try to be patient about using it. The mask might seem uncomfortable to wear at first, and the machine might seem noisy, but using the machine can really pay off. People with sleep apnea who use a CPAP machine feel more rested and generally feel better.  There is also another device that you wear in your mouth called an "oral appliance" or "mandibular advancement device." It also helps keep your airway open while you sleep. But devices do not work as well as CPAP for treating sleep apnea.  In rare cases, when nothing else helps, doctors recommend surgery to keep the airway open. Surgery to do this is not always effective, and even when it is, the problem can come back.  Is sleep apnea dangerous? -- It can be. People with sleep apnea do not get good-quality sleep, so they are often tired and not alert. This puts them at risk for car accidents and other types of accidents. Plus, studies show that people with sleep apnea are more likely than others to have high blood pressure, heart attacks, and other serious heart problems. In people with severe sleep apnea, getting treated (for example, with a CPAP machine) can help prevent some of these problems.  All topics are updated as new evidence becomes available and our peer review process is complete.  This topic retrieved from [employer] on: Sep 21, 2021.  Topic 59930 Version 12.0  Release: 29.4.2 - C29.263  © 2021 UpToDate, Inc. and/or its affiliates. All rights reserved.  figure 1: Airway in a person with sleep apnea     Normally when a person sleeps, the airway remains open, and air can pass from the nose and mouth to the lungs. In a person with sleep apnea, parts of the throat and mouth drop into the airway and block off the flow of air. This can cause loud snoring and interrupt breathing for short periods.  Graphic 87844 Version 5.0    figure 2: Continuous positive airway pressure (CPAP) for sleep apnea     The CPAP " mask gently blows air into your nose while you sleep. It puts just enough pressure on your airway to keep it from closing. The mask in this picture fits over just the nose. Other CPAP devices have masks that fit over the nose and mouth.  Graphic 12506 Version 5.0    Consumer Information Use and Disclaimer   This information is not specific medical advice and does not replace information you receive from your health care provider. This is only a brief summary of general information. It does NOT include all information about conditions, illnesses, injuries, tests, procedures, treatments, therapies, discharge instructions or life-style choices that may apply to you. You must talk with your health care provider for complete information about your health and treatment options. This information should not be used to decide whether or not to accept your health care provider's advice, instructions or recommendations. Only your health care provider has the knowledge and training to provide advice that is right for you. The use of this information is governed by the [employer] End User License Agreement, available at https://www.[employer]/en/solutions/Enish/about/rosangela.The use of [employer] content is governed by the [employer] Terms of Use. ©2021 UpToDate, Inc. All rights reserved.  Copyright   © 2021 UpToDate, Inc. and/or its affiliates. All rights reserved.  Patient Education       Vitamin D Deficiency   The Basics   Written by the doctors and editors at [employer]   What is vitamin D deficiency? -- Vitamin D deficiency is when you do not have enough vitamin D in your body. This is a problem, because the body needs vitamin D to absorb calcium and for other important jobs.  People who do not have enough vitamin D can:  · Have weak or soft bones, which can break easily or change in shape  · Have weak muscles, which makes them more likely to fall  Is there a test for vitamin D deficiency? -- Yes. Your doctor or nurse can do  "a blood test to see if you have enough vitamin D. But you might not need this test. Doctors measure vitamin D levels only in people who are at risk for vitamin D deficiency. This includes people who:  · Spend most or all of their time indoors (for example, because they are in a nursing home)  · Have medical problems (such as celiac disease) that make it hard for them to absorb vitamin D  · Have a condition called "osteoporosis," which makes bones weak  · Broke a bone too easily, such as by falling down  What foods and drinks have vitamin D? -- Foods and drinks that have a lot of vitamin D include (figure 1):  · Milk, orange juice, or yogurt with vitamin D added  · [place] or mackerel  · Canned tuna fish  · Cereals with vitamin D added  · Cod liver oil  People's bodies can also get vitamin D from the sun. The body uses sunlight that shines on the skin to make vitamin D.  What are supplements? -- Supplements are pills, capsules, or liquids that have nutrients in them. Supplements are another way people can get vitamin D.  Do I need to take vitamin D supplements? -- Experts recommend that most adults take supplements that have 600 to 800 international units of vitamin D a day. This is the same as 15 to 20 micrograms of vitamin D. People who do not get enough vitamin D from their food or from the sun might need to take even more.  If your doctor recommends that you take vitamin D supplements, ask him or her which type, how much, and when to take the supplements.  The type and dose of supplement that is right for you will depend on your medical problems and the other medicines you take.  It is important not to take too much vitamin D. Taking too much vitamin D can make you sick.  Can I get extra vitamin D from the sun? -- It is not a good idea to try to get vitamin D by spending time in the sun or using tanning beds. This can lead to sunburn and increase your risk of skin cancer.  All topics are updated as new evidence " becomes available and our peer review process is complete.  This topic retrieved from [employer] on: Sep 21, 2021.  Topic 69746 Version 10.0  Release: 29.4.2 - C29.263  © 2021 UpToDate, Inc. and/or its affiliates. All rights reserved.  figure 1: Foods and drinks with vitamin D     Foods rich in vitamin D include milk, canned tuna fish, cod liver oil, yogurt, ready-to-eat-cereals, salmon, canned sardines, mackerel, and eggs.  Graphic 25507 Version 2.0    Consumer Information Use and Disclaimer   This information is not specific medical advice and does not replace information you receive from your health care provider. This is only a brief summary of general information. It does NOT include all information about conditions, illnesses, injuries, tests, procedures, treatments, therapies, discharge instructions or life-style choices that may apply to you. You must talk with your health care provider for complete information about your health and treatment options. This information should not be used to decide whether or not to accept your health care provider's advice, instructions or recommendations. Only your health care provider has the knowledge and training to provide advice that is right for you. The use of this information is governed by the [employer] End User License Agreement, available at https://www.[employer]/en/solutions/Revstr/about/rosangela.The use of [employer] content is governed by the [employer] Terms of Use. ©2021 UpToDate, Inc. All rights reserved.  Copyright   © 2021 UpToDate, Inc. and/or its affiliates. All rights reserved.    1. Keep follow up appointment with Neurosurgeon Dr. Badillo for management of aneurysm with MRA without contrast in 2025  2. Reschedule sleep study  3. Labs: CBC, CMP  4. Headache diary  5. Rx Aimovig 140mg/ml inject one ml subcutaneous monthly  6. Renew and continue phenergan 25 mg, no driving when taking this medication  7. Smoking cessation  8. Start vitamin d 1000 mcg one  daily over the counter  9. Keep regular follow ups with PCP  10. Keep regular follow up with Rheumatologist   11. Go to ER for any explosive type of headache or change in vision avoid constipation    12. Follow up with neurology in 6 months or sooner if needed

## 2022-01-27 ENCOUNTER — TELEPHONE (OUTPATIENT)
Dept: NEUROLOGY | Facility: CLINIC | Age: 37
End: 2022-01-27
Payer: MEDICARE

## 2022-01-27 NOTE — TELEPHONE ENCOUNTER
Pt voiced understanding  ----- Message from Jaya Leigh NP sent at 1/26/2022  1:20 PM CST -----  Please notify patient that her wbc are elevatd, rbc slightly low, and mcv is slightly elevated. She can follow up with PCP for these, other labs were good, thanks

## 2022-03-01 ENCOUNTER — OFFICE VISIT (OUTPATIENT)
Dept: FAMILY MEDICINE | Facility: CLINIC | Age: 37
End: 2022-03-01
Payer: MEDICARE

## 2022-03-01 VITALS
BODY MASS INDEX: 28.88 KG/M2 | WEIGHT: 163 LBS | TEMPERATURE: 97 F | SYSTOLIC BLOOD PRESSURE: 120 MMHG | HEART RATE: 90 BPM | OXYGEN SATURATION: 99 % | HEIGHT: 63 IN | RESPIRATION RATE: 18 BRPM | DIASTOLIC BLOOD PRESSURE: 64 MMHG

## 2022-03-01 DIAGNOSIS — G47.00 INSOMNIA, UNSPECIFIED TYPE: ICD-10-CM

## 2022-03-01 DIAGNOSIS — F31.70 BIPOLAR AFFECTIVE DISORDER IN REMISSION: Primary | ICD-10-CM

## 2022-03-01 DIAGNOSIS — J30.9 ALLERGIC RHINITIS, UNSPECIFIED SEASONALITY, UNSPECIFIED TRIGGER: ICD-10-CM

## 2022-03-01 DIAGNOSIS — D53.9 NUTRITIONAL ANEMIA: ICD-10-CM

## 2022-03-01 DIAGNOSIS — D72.829 LEUKOCYTOSIS, UNSPECIFIED TYPE: ICD-10-CM

## 2022-03-01 PROBLEM — G25.81 RESTLESS LEGS: Status: ACTIVE | Noted: 2022-03-01

## 2022-03-01 PROBLEM — M32.9 SYSTEMIC LUPUS ERYTHEMATOSUS: Status: RESOLVED | Noted: 2022-01-26 | Resolved: 2022-03-01

## 2022-03-01 LAB
BASOPHILS # BLD AUTO: 0.06 K/UL (ref 0–0.2)
BASOPHILS NFR BLD AUTO: 0.6 % (ref 0–1)
DIFFERENTIAL METHOD BLD: ABNORMAL
EOSINOPHIL # BLD AUTO: 0.25 K/UL (ref 0–0.5)
EOSINOPHIL NFR BLD AUTO: 2.3 % (ref 1–4)
ERYTHROCYTE [DISTWIDTH] IN BLOOD BY AUTOMATED COUNT: 13.5 % (ref 11.5–14.5)
FERRITIN SERPL-MCNC: 37 NG/ML (ref 8–252)
FOLATE SERPL-MCNC: 2.1 NG/ML (ref 3.1–17.5)
HCT VFR BLD AUTO: 40.6 % (ref 38–47)
HGB BLD-MCNC: 13.7 G/DL (ref 12–16)
IMM GRANULOCYTES # BLD AUTO: 0.03 K/UL (ref 0–0.04)
IMM GRANULOCYTES NFR BLD: 0.3 % (ref 0–0.4)
IRON SATN MFR SERPL: 18 % (ref 14–50)
IRON SERPL-MCNC: 67 ΜG/DL (ref 50–170)
LYMPHOCYTES # BLD AUTO: 2.48 K/UL (ref 1–4.8)
LYMPHOCYTES NFR BLD AUTO: 23.3 % (ref 27–41)
MCH RBC QN AUTO: 32.2 PG (ref 27–31)
MCHC RBC AUTO-ENTMCNC: 33.7 G/DL (ref 32–36)
MCV RBC AUTO: 95.3 FL (ref 80–96)
MONOCYTES # BLD AUTO: 0.71 K/UL (ref 0–0.8)
MONOCYTES NFR BLD AUTO: 6.7 % (ref 2–6)
MPC BLD CALC-MCNC: 9.1 FL (ref 9.4–12.4)
NEUTROPHILS # BLD AUTO: 7.13 K/UL (ref 1.8–7.7)
NEUTROPHILS NFR BLD AUTO: 66.8 % (ref 53–65)
NRBC # BLD AUTO: 0 X10E3/UL
NRBC, AUTO (.00): 0 %
PLATELET # BLD AUTO: 313 K/UL (ref 150–400)
RBC # BLD AUTO: 4.26 M/UL (ref 4.2–5.4)
TIBC SERPL-MCNC: 375 ΜG/DL (ref 250–450)
VIT B12 SERPL-MCNC: 464 PG/ML (ref 193–986)
WBC # BLD AUTO: 10.66 K/UL (ref 4.5–11)

## 2022-03-01 PROCEDURE — 99214 OFFICE O/P EST MOD 30 MIN: CPT | Mod: ,,, | Performed by: FAMILY MEDICINE

## 2022-03-01 PROCEDURE — 83550 IRON BINDING TEST: CPT | Mod: ,,, | Performed by: CLINICAL MEDICAL LABORATORY

## 2022-03-01 PROCEDURE — 82728 ASSAY OF FERRITIN: CPT | Mod: ,,, | Performed by: CLINICAL MEDICAL LABORATORY

## 2022-03-01 PROCEDURE — 99214 PR OFFICE/OUTPT VISIT, EST, LEVL IV, 30-39 MIN: ICD-10-PCS | Mod: ,,, | Performed by: FAMILY MEDICINE

## 2022-03-01 PROCEDURE — 82607 VITAMIN B12/FOLATE, SERUM PANEL: ICD-10-PCS | Mod: ,,, | Performed by: CLINICAL MEDICAL LABORATORY

## 2022-03-01 PROCEDURE — 83540 ASSAY OF IRON: CPT | Mod: ,,, | Performed by: CLINICAL MEDICAL LABORATORY

## 2022-03-01 PROCEDURE — 82746 VITAMIN B12/FOLATE, SERUM PANEL: ICD-10-PCS | Mod: ,,, | Performed by: CLINICAL MEDICAL LABORATORY

## 2022-03-01 PROCEDURE — 82607 VITAMIN B-12: CPT | Mod: ,,, | Performed by: CLINICAL MEDICAL LABORATORY

## 2022-03-01 PROCEDURE — 85025 CBC WITH DIFFERENTIAL: ICD-10-PCS | Mod: ,,, | Performed by: CLINICAL MEDICAL LABORATORY

## 2022-03-01 PROCEDURE — 82746 ASSAY OF FOLIC ACID SERUM: CPT | Mod: ,,, | Performed by: CLINICAL MEDICAL LABORATORY

## 2022-03-01 PROCEDURE — 83540 IRON AND TIBC: ICD-10-PCS | Mod: ,,, | Performed by: CLINICAL MEDICAL LABORATORY

## 2022-03-01 PROCEDURE — 85025 COMPLETE CBC W/AUTO DIFF WBC: CPT | Mod: ,,, | Performed by: CLINICAL MEDICAL LABORATORY

## 2022-03-01 PROCEDURE — 82728 FERRITIN: ICD-10-PCS | Mod: ,,, | Performed by: CLINICAL MEDICAL LABORATORY

## 2022-03-01 PROCEDURE — 83550 IRON AND TIBC: ICD-10-PCS | Mod: ,,, | Performed by: CLINICAL MEDICAL LABORATORY

## 2022-03-01 RX ORDER — MONTELUKAST SODIUM 10 MG/1
10 TABLET ORAL DAILY
Qty: 30 TABLET | Refills: 3 | Status: SHIPPED | OUTPATIENT
Start: 2022-03-01 | End: 2022-06-01 | Stop reason: SDUPTHER

## 2022-03-01 RX ORDER — DULOXETIN HYDROCHLORIDE 30 MG/1
30 CAPSULE, DELAYED RELEASE ORAL DAILY
COMMUNITY
Start: 2022-02-11 | End: 2022-07-26

## 2022-03-01 RX ORDER — CARIPRAZINE 1.5 MG/1
1.5 CAPSULE, GELATIN COATED ORAL DAILY
Qty: 30 CAPSULE | Refills: 3 | Status: SHIPPED | OUTPATIENT
Start: 2022-03-01 | End: 2022-06-01 | Stop reason: SDUPTHER

## 2022-03-01 RX ORDER — TRAZODONE HYDROCHLORIDE 50 MG/1
50 TABLET ORAL NIGHTLY
Qty: 30 TABLET | Refills: 3 | Status: SHIPPED | OUTPATIENT
Start: 2022-03-01 | End: 2022-06-01 | Stop reason: SDUPTHER

## 2022-03-01 RX ORDER — HYDROXYZINE PAMOATE 25 MG/1
25 CAPSULE ORAL 2 TIMES DAILY
Qty: 90 CAPSULE | Refills: 0 | Status: SHIPPED | OUTPATIENT
Start: 2022-03-01 | End: 2022-06-01 | Stop reason: SDUPTHER

## 2022-03-01 NOTE — PROGRESS NOTES
[facility] Medicine    Chief Complaint      Chief Complaint   Patient presents with    Follow-up     Med refill       History of Present Illness      Branden Hernandez is a 36 y.o. female with chronic conditions of sleep apnea, bipolar disorder, osteoarthritis, and fibromyalgia who presents today for medication refills.    HPI    Past Medical History:  Past Medical History:   Diagnosis Date    Anxiety     Bipolar disorder     Headache     Obsessive-compulsive disorder        Past Surgical History:   has a past surgical history that includes Hysterectomy and Laparotomy.    Social History:  Social History     Tobacco Use    Smoking status: Current Every Day Smoker     Packs/day: 0.50     Types: Cigarettes    Smokeless tobacco: Never Used   Substance Use Topics    Alcohol use: Never    Drug use: Never       I personally reviewed all past medical, surgical, and social.     Review of Systems   Constitutional: Negative for chills and fever.   HENT: Negative for ear pain and sore throat.    Eyes: Negative for blurred vision.   Respiratory: Negative for cough and shortness of breath.    Cardiovascular: Negative for chest pain and palpitations.   Gastrointestinal: Negative for abdominal pain and constipation.   Genitourinary: Negative for dysuria and hematuria.   Musculoskeletal: Negative for back pain and falls.   Skin: Negative for itching and rash.   Neurological: Negative for weakness and headaches.   Endo/Heme/Allergies: Negative for polydipsia. Does not bruise/bleed easily.   Psychiatric/Behavioral: Negative for suicidal ideas. The patient does not have insomnia.         Medications:  Outpatient Encounter Medications as of 3/1/2022   Medication Sig Dispense Refill    erenumab-aooe (AIMOVIG) 140 mg/mL autoinjector Inject (140 mg) one ml subcutaneous monthly 1 mL 11    estradiol valerate (DELESTROGEN) 20 mg/mL injection Inject 20 mg into the muscle every 30 days.      gabapentin (NEURONTIN) 100 MG capsule        methocarbamoL (ROBAXIN) 500 MG Tab Take 1 tablet (500 mg total) by mouth daily as needed (muscle spasm). 30 tablet 3    progesterone (PROMETRIUM) 200 MG capsule       promethazine (PHENERGAN) 25 MG tablet Take one tablet every 8 hours as needed for headache or nausea, no more than 2 in a day or 2 days in a week 30 tablet 5    [DISCONTINUED] cariprazine (VRAYLAR) 1.5 mg Cap Take 1 capsule (1.5 mg total) by mouth once daily. 30 capsule 3    [DISCONTINUED] hydrOXYzine pamoate (VISTARIL) 25 MG Cap       [DISCONTINUED] montelukast (SINGULAIR) 10 mg tablet Take 1 tablet (10 mg total) by mouth once daily. 30 tablet 3    [DISCONTINUED] traZODone (DESYREL) 50 MG tablet Take 1 tablet (50 mg total) by mouth every evening. 30 tablet 3    cariprazine (VRAYLAR) 1.5 mg Cap Take 1 capsule (1.5 mg total) by mouth once daily. 30 capsule 3    DULoxetine (CYMBALTA) 30 MG capsule Take 30 mg by mouth once daily.      hydrOXYzine pamoate (VISTARIL) 25 MG Cap Take 1 capsule (25 mg total) by mouth 2 (two) times a day. 90 capsule 0    montelukast (SINGULAIR) 10 mg tablet Take 1 tablet (10 mg total) by mouth once daily. 30 tablet 3    traZODone (DESYREL) 50 MG tablet Take 1 tablet (50 mg total) by mouth every evening. 30 tablet 3     No facility-administered encounter medications on file as of 3/1/2022.       Allergies:  Review of patient's allergies indicates:   Allergen Reactions    Latex     Levaquin [levofloxacin]     Penicillins        Health Maintenance:  Immunization History   Administered Date(s) Administered    COVID-19, MRNA, LN-S, PF (Pfizer) (Purple Cap) 05/21/2021, 06/11/2021    Pneumococcal Polysaccharide - 23 Valent 10/10/2012    Tdap 07/29/2021      Health Maintenance   Topic Date Due    TETANUS VACCINE  07/29/2031    Lipid Panel  Completed    Hepatitis C Screening  Discontinued        Physical Exam      Vital Signs  Temp: 97.3 °F (36.3 °C)  Pulse: 90  Resp: 18  SpO2: 99 %  BP: 120/64  BP Location:  "Left arm  Patient Position: Sitting  Height and Weight  Height: 5' 3" (160 cm)  Weight: 73.9 kg (163 lb)  BSA (Calculated - sq m): 1.81 sq meters  BMI (Calculated): 28.9  Weight in (lb) to have BMI = 25: 140.8]    Physical Exam  Vitals and nursing note reviewed.   Constitutional:       General: She is not in acute distress.     Appearance: Normal appearance.   HENT:      Head: Normocephalic and atraumatic.      Right Ear: External ear normal.      Left Ear: External ear normal.   Eyes:      Conjunctiva/sclera: Conjunctivae normal.      Pupils: Pupils are equal, round, and reactive to light.   Cardiovascular:      Rate and Rhythm: Normal rate and regular rhythm.      Heart sounds: Normal heart sounds. No murmur heard.  Pulmonary:      Effort: Pulmonary effort is normal. No respiratory distress.      Breath sounds: Normal breath sounds.   Musculoskeletal:      Cervical back: Normal range of motion.   Skin:     General: Skin is warm and dry.   Neurological:      General: No focal deficit present.      Mental Status: She is alert and oriented to person, place, and time. Mental status is at baseline.   Psychiatric:         Mood and Affect: Mood normal.         Behavior: Behavior normal.         Thought Content: Thought content normal.         Judgment: Judgment normal.          Laboratory:  CBC:  Recent Labs   Lab 04/06/21  1650 01/26/22  0938 03/01/22  1039   WBC 15.76 H 13.44 H 10.66   RBC 4.09 L 4.19 L 4.26   Hemoglobin 13.1 13.4 13.7   Hematocrit 38.7 40.9 40.6   Platelet Count 257 312 313   MCV 94.6 97.6 H 95.3   MCH 32.0 H 32.0 H 32.2 H   MCHC 33.9 32.8 33.7     CMP:  Recent Labs   Lab 04/06/21  1650 01/26/22  0938   Glucose 82 88   Calcium 9.1 8.9   Albumin 3.9 3.6   Total Protein 7.4 7.0   Sodium 138 137   Potassium 4.1 4.5   CO2 26 29   Chloride 106 106   BUN 7 11   Alk Phos 93 85   ALT 18 27   AST 12 L 16   Bilirubin, Total 0.3 0.2     LIPIDS:  Recent Labs   Lab 04/06/21  1650   TSH 1.110     TSH:  Recent Labs "   Lab 04/06/21  1650   TSH 1.110     A1C:        Assessment/Plan     Branden Hernandez is a 36 y.o.female with:    1. Bipolar affective disorder in remission  - cariprazine (VRAYLAR) 1.5 mg Cap; Take 1 capsule (1.5 mg total) by mouth once daily.  Dispense: 30 capsule; Refill: 3    2. Allergic rhinitis, unspecified seasonality, unspecified trigger  - montelukast (SINGULAIR) 10 mg tablet; Take 1 tablet (10 mg total) by mouth once daily.  Dispense: 30 tablet; Refill: 3    3. Insomnia, unspecified type  - traZODone (DESYREL) 50 MG tablet; Take 1 tablet (50 mg total) by mouth every evening.  Dispense: 30 tablet; Refill: 3    4. Leukocytosis, unspecified type  - CBC Auto Differential; Future  - CBC Auto Differential    5. Nutritional anemia  - CBC Auto Differential; Future  - Ferritin; Future  - Iron and TIBC; Future  - Vitamin B12 & Folate; Future  - CBC Auto Differential  - Ferritin  - Iron and TIBC  - Vitamin B12 & Folate       Chronic conditions status updated as per HPI.  Other than changes above, cont current medications and maintain follow up with specialists.  Return to clinic in 3 months.    Lesa Peterson DO  [facility]

## 2022-03-11 DIAGNOSIS — Z71.89 COMPLEX CARE COORDINATION: ICD-10-CM

## 2022-06-01 ENCOUNTER — OFFICE VISIT (OUTPATIENT)
Dept: FAMILY MEDICINE | Facility: CLINIC | Age: 37
End: 2022-06-01
Payer: MEDICARE

## 2022-06-01 VITALS
DIASTOLIC BLOOD PRESSURE: 82 MMHG | BODY MASS INDEX: 29.23 KG/M2 | SYSTOLIC BLOOD PRESSURE: 126 MMHG | HEIGHT: 63 IN | OXYGEN SATURATION: 98 % | WEIGHT: 165 LBS | HEART RATE: 80 BPM

## 2022-06-01 DIAGNOSIS — F41.9 ANXIETY: ICD-10-CM

## 2022-06-01 DIAGNOSIS — Z87.891 HISTORY OF TOBACCO USE: ICD-10-CM

## 2022-06-01 DIAGNOSIS — G47.00 INSOMNIA, UNSPECIFIED TYPE: ICD-10-CM

## 2022-06-01 DIAGNOSIS — F31.9 BIPOLAR AFFECTIVE DISORDER, REMISSION STATUS UNSPECIFIED: Primary | ICD-10-CM

## 2022-06-01 DIAGNOSIS — J30.9 ALLERGIC RHINITIS, UNSPECIFIED SEASONALITY, UNSPECIFIED TRIGGER: ICD-10-CM

## 2022-06-01 PROCEDURE — 99214 OFFICE O/P EST MOD 30 MIN: CPT | Mod: ,,, | Performed by: FAMILY MEDICINE

## 2022-06-01 PROCEDURE — 99214 PR OFFICE/OUTPT VISIT, EST, LEVL IV, 30-39 MIN: ICD-10-PCS | Mod: ,,, | Performed by: FAMILY MEDICINE

## 2022-06-01 PROCEDURE — 99406 BEHAV CHNG SMOKING 3-10 MIN: CPT | Mod: ,,, | Performed by: FAMILY MEDICINE

## 2022-06-01 PROCEDURE — 99406 PR TOBACCO USE CESSATION INTERMEDIATE 3-10 MINUTES: ICD-10-PCS | Mod: ,,, | Performed by: FAMILY MEDICINE

## 2022-06-01 RX ORDER — CARIPRAZINE 1.5 MG/1
1.5 CAPSULE, GELATIN COATED ORAL DAILY
Qty: 30 CAPSULE | Refills: 3 | Status: SHIPPED | OUTPATIENT
Start: 2022-06-01 | End: 2022-08-31

## 2022-06-01 RX ORDER — TRAZODONE HYDROCHLORIDE 100 MG/1
100 TABLET ORAL NIGHTLY
Qty: 30 TABLET | Refills: 3 | Status: SHIPPED | OUTPATIENT
Start: 2022-06-01 | End: 2022-08-31 | Stop reason: SDUPTHER

## 2022-06-01 RX ORDER — HYDROXYZINE PAMOATE 25 MG/1
25 CAPSULE ORAL 2 TIMES DAILY
Qty: 90 CAPSULE | Refills: 0 | Status: SHIPPED | OUTPATIENT
Start: 2022-06-01 | End: 2023-03-02

## 2022-06-01 RX ORDER — MONTELUKAST SODIUM 10 MG/1
10 TABLET ORAL DAILY
Qty: 30 TABLET | Refills: 3 | Status: SHIPPED | OUTPATIENT
Start: 2022-06-01 | End: 2022-08-31

## 2022-06-01 NOTE — PROGRESS NOTES
[facility] Medicine    Chief Complaint      Chief Complaint   Patient presents with    Follow-up     3 month        History of Present Illness      Branden Hernandez is a 36 y.o. female who presents today for three-month follow-up.    HPI    Past Medical History:  Past Medical History:   Diagnosis Date    Anxiety     Bipolar disorder     Headache     Obsessive-compulsive disorder        Past Surgical History:   has a past surgical history that includes Hysterectomy and Laparotomy.    Social History:  Social History     Tobacco Use    Smoking status: Current Every Day Smoker     Packs/day: 0.50     Types: Cigarettes    Smokeless tobacco: Never Used   Substance Use Topics    Alcohol use: Never    Drug use: Never       I personally reviewed all past medical, surgical, and social.     Review of Systems   Constitutional: Negative for chills and fever.   HENT: Negative for ear pain and sore throat.    Eyes: Negative for blurred vision.   Respiratory: Negative for cough and shortness of breath.    Cardiovascular: Negative for chest pain and palpitations.   Gastrointestinal: Negative for abdominal pain and constipation.   Genitourinary: Negative for dysuria and hematuria.   Musculoskeletal: Negative for back pain and falls.   Skin: Negative for itching and rash.   Neurological: Negative for weakness and headaches.   Endo/Heme/Allergies: Negative for polydipsia. Does not bruise/bleed easily.   Psychiatric/Behavioral: Positive for depression. Negative for suicidal ideas. The patient is nervous/anxious. The patient does not have insomnia.         Medications:  Outpatient Encounter Medications as of 6/1/2022   Medication Sig Dispense Refill    DULoxetine (CYMBALTA) 30 MG capsule Take 30 mg by mouth once daily.      erenumab-aooe (AIMOVIG) 140 mg/mL autoinjector Inject (140 mg) one ml subcutaneous monthly 1 mL 11    estradiol valerate (DELESTROGEN) 20 mg/mL injection Inject 20 mg into the muscle every 30 days.       gabapentin (NEURONTIN) 100 MG capsule       progesterone (PROMETRIUM) 200 MG capsule       promethazine (PHENERGAN) 25 MG tablet Take one tablet every 8 hours as needed for headache or nausea, no more than 2 in a day or 2 days in a week 30 tablet 5    [DISCONTINUED] cariprazine (VRAYLAR) 1.5 mg Cap Take 1 capsule (1.5 mg total) by mouth once daily. 30 capsule 3    [DISCONTINUED] hydrOXYzine pamoate (VISTARIL) 25 MG Cap Take 1 capsule (25 mg total) by mouth 2 (two) times a day. 90 capsule 0    [DISCONTINUED] methocarbamoL (ROBAXIN) 500 MG Tab Take 1 tablet (500 mg total) by mouth daily as needed (muscle spasm). 30 tablet 3    [DISCONTINUED] montelukast (SINGULAIR) 10 mg tablet Take 1 tablet (10 mg total) by mouth once daily. 30 tablet 3    [DISCONTINUED] traZODone (DESYREL) 50 MG tablet Take 1 tablet (50 mg total) by mouth every evening. 30 tablet 3    cariprazine (VRAYLAR) 1.5 mg Cap Take 1 capsule (1.5 mg total) by mouth once daily. 30 capsule 3    hydrOXYzine pamoate (VISTARIL) 25 MG Cap Take 1 capsule (25 mg total) by mouth 2 (two) times a day. 90 capsule 0    montelukast (SINGULAIR) 10 mg tablet Take 1 tablet (10 mg total) by mouth once daily. 30 tablet 3    traZODone (DESYREL) 100 MG tablet Take 1 tablet (100 mg total) by mouth every evening. 30 tablet 3     No facility-administered encounter medications on file as of 6/1/2022.       Allergies:  Review of patient's allergies indicates:   Allergen Reactions    Latex     Levaquin [levofloxacin]     Penicillins        Health Maintenance:  Immunization History   Administered Date(s) Administered    COVID-19, MRNA, LN-S, PF (Pfizer) (Purple Cap) 05/21/2021, 06/11/2021    Pneumococcal Polysaccharide - 23 Valent 10/10/2012    Tdap 07/29/2021      Health Maintenance   Topic Date Due    TETANUS VACCINE  07/29/2031    Lipid Panel  Completed    Hepatitis C Screening  Discontinued        Physical Exam      Vital Signs  Pulse: 80  SpO2: 98 %  BP:  "126/82  Height and Weight  Height: 5' 3" (160 cm)  Weight: 74.8 kg (165 lb)  BSA (Calculated - sq m): 1.82 sq meters  BMI (Calculated): 29.2  Weight in (lb) to have BMI = 25: 140.8]    Physical Exam  Vitals and nursing note reviewed.   Constitutional:       General: She is not in acute distress.     Appearance: Normal appearance.   HENT:      Head: Normocephalic and atraumatic.      Right Ear: External ear normal.      Left Ear: External ear normal.      Nose: Nose normal.   Eyes:      Extraocular Movements: Extraocular movements intact.      Conjunctiva/sclera: Conjunctivae normal.      Pupils: Pupils are equal, round, and reactive to light.   Cardiovascular:      Rate and Rhythm: Normal rate and regular rhythm.      Heart sounds: Normal heart sounds. No murmur heard.  Pulmonary:      Effort: Pulmonary effort is normal. No respiratory distress.      Breath sounds: Normal breath sounds.   Musculoskeletal:      Cervical back: Normal range of motion.   Skin:     General: Skin is warm and dry.   Neurological:      General: No focal deficit present.      Mental Status: She is alert and oriented to person, place, and time. Mental status is at baseline.      Cranial Nerves: No cranial nerve deficit.      Gait: Gait normal.   Psychiatric:         Mood and Affect: Mood normal.         Behavior: Behavior normal.         Thought Content: Thought content normal.         Judgment: Judgment normal.          Laboratory:  CBC:  Recent Labs   Lab 04/06/21  1650 01/26/22  0938 03/01/22  1039   WBC 15.76 H 13.44 H 10.66   RBC 4.09 L 4.19 L 4.26   Hemoglobin 13.1 13.4 13.7   Hematocrit 38.7 40.9 40.6   Platelet Count 257 312 313   MCV 94.6 97.6 H 95.3   MCH 32.0 H 32.0 H 32.2 H   MCHC 33.9 32.8 33.7     CMP:  Recent Labs   Lab 04/06/21  1650 01/26/22  0938   Glucose 82 88   Calcium 9.1 8.9   Albumin 3.9 3.6   Total Protein 7.4 7.0   Sodium 138 137   Potassium 4.1 4.5   CO2 26 29   Chloride 106 106   BUN 7 11   Alk Phos 93 85   ALT 18 " 27   AST 12 L 16   Bilirubin, Total 0.3 0.2     LIPIDS:  Recent Labs   Lab 04/06/21  1650   TSH 1.110     TSH:  Recent Labs   Lab 04/06/21  1650   TSH 1.110     A1C:        Assessment/Plan     Branden Hernandez is a 36 y.o.female with:    1. Bipolar affective disorder, remission status unspecified  - cariprazine (VRAYLAR) 1.5 mg Cap; Take 1 capsule (1.5 mg total) by mouth once daily.  Dispense: 30 capsule; Refill: 3  - Ambulatory referral/consult to Psychiatry; Future    2. Allergic rhinitis, unspecified seasonality, unspecified trigger  - montelukast (SINGULAIR) 10 mg tablet; Take 1 tablet (10 mg total) by mouth once daily.  Dispense: 30 tablet; Refill: 3    3. Insomnia, unspecified type  - traZODone (DESYREL) 100 MG tablet; Take 1 tablet (100 mg total) by mouth every evening.  Dispense: 30 tablet; Refill: 3    4. Anxiety  - hydrOXYzine pamoate (VISTARIL) 25 MG Cap; Take 1 capsule (25 mg total) by mouth 2 (two) times a day.  Dispense: 90 capsule; Refill: 0    5. History of tobacco use  - [91905] GA TOBACCO USE CESSATION INTERMEDIATE 3-10 MIN       Chronic conditions status updated as per HPI.  Other than changes above, cont current medications and maintain follow up with specialists.  Return to clinic in 3 months.    Lesa Peterson DO  [facility]                Tobacco Use/Cessation:  I assessed Branden Hernandez and discussed smoking cessation with her for 3-10 minutes.    Ready to quit: No  Counseling given: Yes      Social History     Tobacco Use   Smoking Status Current Every Day Smoker    Packs/day: 0.50    Types: Cigarettes   Smokeless Tobacco Never Used

## 2022-06-22 ENCOUNTER — TELEPHONE (OUTPATIENT)
Dept: FAMILY MEDICINE | Facility: CLINIC | Age: 37
End: 2022-06-22
Payer: MEDICARE

## 2022-06-22 NOTE — TELEPHONE ENCOUNTER
----- Message from Krystle Delaney sent at 6/20/2022  4:01 PM CDT -----  Regarding: [employer]  Pt stated that the pharmacy what to know the brand of the glucose strips, that they cant fill until dr. Peterson put what brand. [facility], thanks :)))))))....
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
## 2022-07-26 ENCOUNTER — OFFICE VISIT (OUTPATIENT)
Dept: NEUROLOGY | Facility: CLINIC | Age: 37
End: 2022-07-26
Payer: MEDICARE

## 2022-07-26 VITALS
DIASTOLIC BLOOD PRESSURE: 82 MMHG | WEIGHT: 173 LBS | SYSTOLIC BLOOD PRESSURE: 124 MMHG | BODY MASS INDEX: 30.65 KG/M2 | HEIGHT: 63 IN | HEART RATE: 93 BPM | OXYGEN SATURATION: 98 %

## 2022-07-26 DIAGNOSIS — Z79.899 ON LONG TERM DRUG THERAPY: ICD-10-CM

## 2022-07-26 DIAGNOSIS — I67.1 INTRACRANIAL ANEURYSM: Chronic | ICD-10-CM

## 2022-07-26 DIAGNOSIS — E55.9 VITAMIN D DEFICIENCY: Primary | ICD-10-CM

## 2022-07-26 DIAGNOSIS — G47.30 SLEEP APNEA, UNSPECIFIED TYPE: ICD-10-CM

## 2022-07-26 DIAGNOSIS — G43.119 INTRACTABLE MIGRAINE WITH AURA WITHOUT STATUS MIGRAINOSUS: Primary | Chronic | ICD-10-CM

## 2022-07-26 DIAGNOSIS — E55.9 VITAMIN D DEFICIENCY: ICD-10-CM

## 2022-07-26 PROCEDURE — 99214 PR OFFICE/OUTPT VISIT, EST, LEVL IV, 30-39 MIN: ICD-10-PCS | Mod: S$PBB,,, | Performed by: NURSE PRACTITIONER

## 2022-07-26 PROCEDURE — 99214 OFFICE O/P EST MOD 30 MIN: CPT | Mod: S$PBB,,, | Performed by: NURSE PRACTITIONER

## 2022-07-26 PROCEDURE — 99215 OFFICE O/P EST HI 40 MIN: CPT | Mod: PBBFAC | Performed by: NURSE PRACTITIONER

## 2022-07-26 RX ORDER — OMEPRAZOLE 20 MG/1
20 CAPSULE, DELAYED RELEASE ORAL DAILY
COMMUNITY
Start: 2022-07-12 | End: 2022-11-22

## 2022-07-26 RX ORDER — KETOROLAC TROMETHAMINE 5 MG/ML
SOLUTION OPHTHALMIC
COMMUNITY
Start: 2022-06-27 | End: 2023-08-04 | Stop reason: ALTCHOICE

## 2022-07-26 RX ORDER — ERGOCALCIFEROL 1.25 MG/1
CAPSULE ORAL
Qty: 12 CAPSULE | Refills: 1 | Status: SHIPPED | OUTPATIENT
Start: 2022-07-26

## 2022-07-26 RX ORDER — PREDNISONE 10 MG/1
TABLET ORAL
COMMUNITY
Start: 2022-07-12 | End: 2022-08-31 | Stop reason: ALTCHOICE

## 2022-07-26 RX ORDER — QUETIAPINE FUMARATE 50 MG/1
TABLET, FILM COATED ORAL
COMMUNITY
Start: 2022-07-13 | End: 2022-08-31 | Stop reason: SDUPTHER

## 2022-07-26 RX ORDER — PROMETHAZINE HYDROCHLORIDE 25 MG/1
TABLET ORAL
Qty: 30 TABLET | Refills: 3 | Status: SHIPPED | OUTPATIENT
Start: 2022-07-26 | End: 2022-11-22 | Stop reason: SDUPTHER

## 2022-07-26 NOTE — PATIENT INSTRUCTIONS
1. Keep follow up appointment with Neurosurgeon Dr. Badillo for management of aneurysm with MRA without contrast in 2025  2. Reschedule sleep study  3. Labs: CBC, CMP, b12, folate, vit d  4. Headache diary  5. Continue Aimovig 140mg/ml inject one ml subcutaneous monthly  6. Renew and continue phenergan 25 mg, no driving when taking this medication  7. Smoking cessation  8. Referral to headache clinic in [place], [place]  9. Keep regular follow ups with PCP  10. Keep regular follow up with Rheumatologist   11. Go to ER for any explosive type of headache or change in vision, avoid constipation    12. Follow up with neurology in 3 months or sooner if needed

## 2022-07-28 ENCOUNTER — TELEPHONE (OUTPATIENT)
Dept: NEUROLOGY | Facility: CLINIC | Age: 37
End: 2022-07-28
Payer: MEDICARE

## 2022-07-28 NOTE — TELEPHONE ENCOUNTER
Pt voiced understanding  ----- Message from Jaya Leigh NP sent at 7/26/2022  5:53 PM CDT -----  Please let patient know her alk phos is slightly elevated, vit d real low (will effect increase in headaches) wbc real high, rbc low, mcv  elevated, I have sent in some vit d with instructions on the bottle to her pharmacy, she needs to follow up with her PCP asap about the elevated WBC, thanks

## 2022-08-31 ENCOUNTER — OFFICE VISIT (OUTPATIENT)
Dept: FAMILY MEDICINE | Facility: CLINIC | Age: 37
End: 2022-08-31
Payer: MEDICARE

## 2022-08-31 ENCOUNTER — APPOINTMENT (OUTPATIENT)
Dept: RADIOLOGY | Facility: CLINIC | Age: 37
End: 2022-08-31
Attending: FAMILY MEDICINE
Payer: MEDICARE

## 2022-08-31 VITALS
HEIGHT: 63 IN | TEMPERATURE: 98 F | DIASTOLIC BLOOD PRESSURE: 78 MMHG | RESPIRATION RATE: 18 BRPM | OXYGEN SATURATION: 98 % | WEIGHT: 165 LBS | SYSTOLIC BLOOD PRESSURE: 118 MMHG | HEART RATE: 70 BPM | BODY MASS INDEX: 29.23 KG/M2

## 2022-08-31 DIAGNOSIS — F43.10 PTSD (POST-TRAUMATIC STRESS DISORDER): ICD-10-CM

## 2022-08-31 DIAGNOSIS — G47.00 INSOMNIA, UNSPECIFIED TYPE: ICD-10-CM

## 2022-08-31 DIAGNOSIS — M25.562 CHRONIC PAIN OF LEFT KNEE: ICD-10-CM

## 2022-08-31 DIAGNOSIS — M87.88 KNEE PAIN WITH AVASCULAR NECROSIS DETERMINED BY X-RAY: Primary | ICD-10-CM

## 2022-08-31 DIAGNOSIS — F41.0 PANIC DISORDER: ICD-10-CM

## 2022-08-31 DIAGNOSIS — Z79.899 OTHER LONG TERM (CURRENT) DRUG THERAPY: ICD-10-CM

## 2022-08-31 DIAGNOSIS — J30.9 ALLERGIC RHINITIS, UNSPECIFIED SEASONALITY, UNSPECIFIED TRIGGER: Primary | ICD-10-CM

## 2022-08-31 DIAGNOSIS — F32.9 MAJOR DEPRESSIVE DISORDER WITH CURRENT ACTIVE EPISODE, UNSPECIFIED DEPRESSION EPISODE SEVERITY, UNSPECIFIED WHETHER RECURRENT: ICD-10-CM

## 2022-08-31 DIAGNOSIS — M06.9 RHEUMATOID ARTHRITIS, INVOLVING UNSPECIFIED SITE, UNSPECIFIED WHETHER RHEUMATOID FACTOR PRESENT: ICD-10-CM

## 2022-08-31 DIAGNOSIS — F17.200 TOBACCO DEPENDENCE SYNDROME: ICD-10-CM

## 2022-08-31 DIAGNOSIS — G89.29 CHRONIC PAIN OF LEFT KNEE: ICD-10-CM

## 2022-08-31 PROBLEM — I72.9 ANEURYSM: Status: ACTIVE | Noted: 2022-07-29

## 2022-08-31 LAB
CHOLEST SERPL-MCNC: 158 MG/DL (ref 0–200)
CHOLEST/HDLC SERPL: 4.1 {RATIO}
HDLC SERPL-MCNC: 39 MG/DL (ref 40–60)
LDLC SERPL CALC-MCNC: 92 MG/DL
LDLC/HDLC SERPL: 2.4 {RATIO}
NONHDLC SERPL-MCNC: 119 MG/DL
TRIGL SERPL-MCNC: 137 MG/DL (ref 35–150)
VLDLC SERPL-MCNC: 27 MG/DL

## 2022-08-31 PROCEDURE — 73560 X-RAY EXAM OF KNEE 1 OR 2: CPT | Mod: TC,RHCUB,LT | Performed by: FAMILY MEDICINE

## 2022-08-31 PROCEDURE — 80061 LIPID PANEL: CPT | Mod: ,,, | Performed by: CLINICAL MEDICAL LABORATORY

## 2022-08-31 PROCEDURE — 80061 LIPID PANEL: ICD-10-PCS | Mod: ,,, | Performed by: CLINICAL MEDICAL LABORATORY

## 2022-08-31 PROCEDURE — 73560 X-RAY EXAM OF KNEE 1 OR 2: CPT | Mod: 26,LT,, | Performed by: RADIOLOGY

## 2022-08-31 PROCEDURE — 73560 XR KNEE 1 OR 2 VIEW LEFT: ICD-10-PCS | Mod: 26,LT,, | Performed by: RADIOLOGY

## 2022-08-31 PROCEDURE — 99214 PR OFFICE/OUTPT VISIT, EST, LEVL IV, 30-39 MIN: ICD-10-PCS | Mod: ,,, | Performed by: FAMILY MEDICINE

## 2022-08-31 PROCEDURE — 99214 OFFICE O/P EST MOD 30 MIN: CPT | Mod: ,,, | Performed by: FAMILY MEDICINE

## 2022-08-31 RX ORDER — PREDNISONE 5 MG/1
TABLET ORAL
COMMUNITY
Start: 2022-08-10 | End: 2023-03-02 | Stop reason: ALTCHOICE

## 2022-08-31 RX ORDER — PROCHLORPERAZINE MALEATE 10 MG
TABLET ORAL
COMMUNITY

## 2022-08-31 RX ORDER — ZOLPIDEM TARTRATE 10 MG/1
10 TABLET ORAL NIGHTLY PRN
COMMUNITY
Start: 2022-07-28 | End: 2022-11-22

## 2022-08-31 RX ORDER — MONTELUKAST SODIUM 10 MG/1
1 TABLET ORAL
COMMUNITY
End: 2022-08-31

## 2022-08-31 RX ORDER — CLONAZEPAM 0.5 MG/1
0.5 TABLET ORAL
COMMUNITY
End: 2022-11-22

## 2022-08-31 RX ORDER — ATOGEPANT 60 MG/1
60 TABLET ORAL
COMMUNITY

## 2022-08-31 RX ORDER — MONTELUKAST SODIUM 10 MG/1
10 TABLET ORAL DAILY
Qty: 30 TABLET | Refills: 3 | Status: SHIPPED | OUTPATIENT
Start: 2022-08-31 | End: 2022-11-22

## 2022-08-31 RX ORDER — ZOLPIDEM TARTRATE 10 MG/1
TABLET ORAL
COMMUNITY
End: 2022-08-31 | Stop reason: SDUPTHER

## 2022-08-31 RX ORDER — METHOCARBAMOL 500 MG/1
500 TABLET, FILM COATED ORAL
COMMUNITY
End: 2022-11-22

## 2022-08-31 RX ORDER — TRAZODONE HYDROCHLORIDE 100 MG/1
100 TABLET ORAL NIGHTLY
Qty: 30 TABLET | Refills: 3 | Status: SHIPPED | OUTPATIENT
Start: 2022-08-31 | End: 2022-11-30 | Stop reason: SDUPTHER

## 2022-08-31 RX ORDER — UBROGEPANT 100 MG/1
TABLET ORAL
COMMUNITY
Start: 2022-08-23

## 2022-08-31 RX ORDER — ESTRADIOL VALERATE 40 MG/ML
INJECTION INTRAMUSCULAR
COMMUNITY
End: 2022-08-31 | Stop reason: SDUPTHER

## 2022-08-31 RX ORDER — QUETIAPINE FUMARATE 50 MG/1
TABLET, FILM COATED ORAL
Qty: 30 TABLET | Refills: 3 | Status: SHIPPED | OUTPATIENT
Start: 2022-08-31 | End: 2022-11-30 | Stop reason: SDUPTHER

## 2022-08-31 RX ORDER — QUETIAPINE FUMARATE 50 MG/1
TABLET, FILM COATED ORAL
Qty: 30 TABLET | Refills: 3 | Status: SHIPPED | OUTPATIENT
Start: 2022-08-31 | End: 2022-08-31 | Stop reason: SDUPTHER

## 2022-08-31 RX ORDER — QUETIAPINE FUMARATE 50 MG/1
TABLET, FILM COATED ORAL
Qty: 30 TABLET | Refills: 3 | Status: SHIPPED | OUTPATIENT
Start: 2022-08-31 | End: 2022-08-31

## 2022-08-31 RX ORDER — HYDROXYCHLOROQUINE SULFATE 200 MG/1
200 TABLET, FILM COATED ORAL
COMMUNITY
Start: 2022-08-10 | End: 2023-03-02

## 2022-08-31 RX ORDER — RIMEGEPANT SULFATE 75 MG/75MG
TABLET, ORALLY DISINTEGRATING ORAL
COMMUNITY

## 2022-08-31 RX ORDER — ONABOTULINUMTOXINA 200 [USP'U]/1
200 INJECTION, POWDER, LYOPHILIZED, FOR SOLUTION INTRADERMAL; INTRAMUSCULAR
COMMUNITY
Start: 2022-08-24

## 2022-08-31 NOTE — PROGRESS NOTES
[facility]    Chief Complaint      Chief Complaint   Patient presents with    Follow-up     3 month  fasting       History of Present Illness      Branden Hernandez is a 36 y.o. female  who presents today for three-month follow-up.    Knee Pain   There was no injury mechanism. The pain is present in the left knee. The quality of the pain is described as aching and burning. The pain is moderate. The pain has been Constant since onset. Pertinent negatives include no inability to bear weight or loss of sensation. She reports no foreign bodies present. The symptoms are aggravated by weight bearing and movement. She has tried ice and heat for the symptoms. The treatment provided no relief.     Past Medical History:  History reviewed. No pertinent past medical history.    Past Surgical History:   has a past surgical history that includes Hysterectomy and Laparotomy.    Social History:  Social History     Tobacco Use    Smoking status: Every Day     Packs/day: 0.50     Years: 20.00     Pack years: 10.00     Types: Cigarettes     Start date: 2002    Smokeless tobacco: Never    Tobacco comments:     5 cigs a day   Substance Use Topics    Alcohol use: Never    Drug use: Never       I personally reviewed all past medical, surgical, and social.     Review of Systems   Constitutional:  Negative for chills and fever.   HENT:  Negative for ear pain and sore throat.    Eyes:  Negative for blurred vision.   Respiratory:  Negative for cough and shortness of breath.    Cardiovascular:  Negative for chest pain and palpitations.   Gastrointestinal:  Negative for abdominal pain and constipation.   Genitourinary:  Negative for dysuria and hematuria.   Musculoskeletal:  Positive for joint pain. Negative for back pain and falls.        Complaining of left knee pain   Skin:  Negative for itching and rash.   Neurological:  Negative for weakness and headaches.   Endo/Heme/Allergies:  Negative for polydipsia. Does not bruise/bleed  easily.   Psychiatric/Behavioral:  Negative for suicidal ideas. The patient does not have insomnia.       Medications:  Outpatient Encounter Medications as of 2022   Medication Sig Dispense Refill    atogepant (QULIPTA) 60 mg Tab 60 mg.      BOTOX 200 unit SolR 200 Units.      clonazePAM (KLONOPIN) 0.5 MG tablet 0.5 mg.      erenumab-aooe (AIMOVIG) 140 mg/mL autoinjector Inject (140 mg) one ml subcutaneous monthly 1 mL 11    ergocalciferol (ERGOCALCIFEROL) 50,000 unit Cap Take one capsule weekly for 12 weeks then reduce to one capsule monthly 12 capsule 1    estradiol valerate (DELESTROGEN) 20 mg/mL injection Inject 20 mg into the muscle every 30 days.      gabapentin (NEURONTIN) 100 MG capsule       hydrOXYchloroQUINE (PLAQUENIL) 200 mg tablet 200 mg.      hydrOXYzine pamoate (VISTARIL) 25 MG Cap Take 1 capsule (25 mg total) by mouth 2 (two) times a day. 90 capsule 0    ketorolac 0.5% (ACULAR) 0.5 % Drop SMARTSI Drop(s) In Eye(s) 4 Times Daily PRN      methocarbamoL (ROBAXIN) 500 MG Tab Take 500 mg by mouth as needed.      omeprazole (PRILOSEC) 20 MG capsule Take 20 mg by mouth once daily.      predniSONE (DELTASONE) 5 MG tablet prednisone 5 mg tablet   TAKE ONE TABLET BY MOUTH EVERY DAY FOR 30 DAYS      prochlorperazine (COMPAZINE) 10 MG tablet prochlorperazine maleate 10 mg tablet   TAKE ONE TABLET BY MOUTH THREE TIMES DAILY      progesterone (PROMETRIUM) 200 MG capsule       promethazine (PHENERGAN) 25 MG tablet Take one tablet every 8 hours as needed for headache or nausea, no more than 2 in a day or 2 days in a week 30 tablet 3    rimegepant (NURTEC) 75 mg odt Nurtec ODT 75 mg disintegrating tablet      UBRELVY 100 mg tablet Take by mouth.      zolpidem (AMBIEN) 10 mg Tab Take 10 mg by mouth nightly as needed.      [DISCONTINUED] cariprazine (VRAYLAR) 1.5 mg Cap Take 1 capsule (1.5 mg total) by mouth once daily. 30 capsule 3    [DISCONTINUED] estradiol valerate (DELESTROGEN) 40 mg/mL injection  "estradiol valerate 40 mg/mL intramuscular oil   INJECT 1/2 ML INTRAMUSCULARLY EVERY 4 WEEKS      [DISCONTINUED] montelukast (SINGULAIR) 10 mg tablet Take 1 tablet (10 mg total) by mouth once daily. 30 tablet 3    [DISCONTINUED] montelukast (SINGULAIR) 10 mg tablet 1 tablet.      [DISCONTINUED] predniSONE (DELTASONE) 10 MG tablet TAKE ONE TABLET BY MOUTH EVERY DAY FOR FOURTEEN DAYS      [DISCONTINUED] QUEtiapine (SEROQUEL) 50 MG tablet TAKE 1 TABLET (50 MG TOTAL) BY MOUTH NIGHTLY.      [DISCONTINUED] traZODone (DESYREL) 100 MG tablet Take 1 tablet (100 mg total) by mouth every evening. 30 tablet 3    [DISCONTINUED] zolpidem (AMBIEN) 10 mg Tab Ambien 10 mg tablet   Take 1 tablet every day by oral route at bedtime.      montelukast (SINGULAIR) 10 mg tablet Take 1 tablet (10 mg total) by mouth once daily. 30 tablet 3    QUEtiapine (SEROQUEL) 50 MG tablet TAKE 1 TABLET (50 MG TOTAL) BY MOUTH NIGHTLY.  Strength: 50 mg 30 tablet 3    traZODone (DESYREL) 100 MG tablet Take 1 tablet (100 mg total) by mouth every evening. 30 tablet 3    [DISCONTINUED] QUEtiapine (SEROQUEL) 50 MG tablet TAKE 1 TABLET (50 MG TOTAL) BY MOUTH NIGHTLY.  Strength: 50 mg 30 tablet 3     No facility-administered encounter medications on file as of 8/31/2022.       Allergies:  Review of patient's allergies indicates:   Allergen Reactions    Latex Other (See Comments)    Penicillins     Levofloxacin Rash       Health Maintenance:  Immunization History   Administered Date(s) Administered    COVID-19, MRNA, LN-S, PF (Pfizer) (Purple Cap) 05/21/2021, 06/11/2021    Pneumococcal Polysaccharide - 23 Valent 10/10/2012    Tdap 07/29/2021      Health Maintenance   Topic Date Due    TETANUS VACCINE  07/29/2031    Lipid Panel  Completed    Hepatitis C Screening  Discontinued        Physical Exam      Vital Signs  Temp: 98.2 °F (36.8 °C)  Pulse: 70  Resp: 18  SpO2: 98 %  BP: 118/78  Height and Weight  Height: 5' 3" (160 cm)  Weight: 74.8 kg (165 lb)  BSA " (Calculated - sq m): 1.82 sq meters  BMI (Calculated): 29.2  Weight in (lb) to have BMI = 25: 140.8]    Physical Exam  Vitals and nursing note reviewed.   Constitutional:       General: She is not in acute distress.     Appearance: Normal appearance.   HENT:      Head: Normocephalic and atraumatic.      Right Ear: External ear normal.      Left Ear: External ear normal.   Eyes:      Extraocular Movements: Extraocular movements intact.      Conjunctiva/sclera: Conjunctivae normal.      Pupils: Pupils are equal, round, and reactive to light.   Cardiovascular:      Rate and Rhythm: Normal rate and regular rhythm.      Heart sounds: Normal heart sounds. No murmur heard.  Pulmonary:      Effort: Pulmonary effort is normal. No respiratory distress.      Breath sounds: Normal breath sounds.   Musculoskeletal:      Left knee: No crepitus. Normal range of motion. Tenderness present over the medial joint line.      Right lower leg: No edema.      Left lower leg: No edema.   Skin:     Findings: No rash.   Neurological:      General: No focal deficit present.      Mental Status: She is alert and oriented to person, place, and time. Mental status is at baseline.      Cranial Nerves: No cranial nerve deficit.      Gait: Gait normal.   Psychiatric:         Mood and Affect: Mood normal.         Behavior: Behavior normal.         Thought Content: Thought content normal.         Judgment: Judgment normal.        Laboratory:  CBC:  Recent Labs   Lab 01/26/22  0938 03/01/22  1039 07/26/22  0935   WBC 13.44 H 10.66 19.31 H   RBC 4.19 L 4.26 4.07 L   Hemoglobin 13.4 13.7 13.3   Hematocrit 40.9 40.6 40.0   Platelet Count 312 313 346   MCV 97.6 H 95.3 98.3 H   MCH 32.0 H 32.2 H 32.7 H   MCHC 32.8 33.7 33.3     CMP:  Recent Labs   Lab 04/06/21  1650 01/26/22  0938 07/26/22  0935   Glucose 82 88 90   Calcium 9.1 8.9 8.7   Albumin 3.9 3.6 3.5   Total Protein 7.4 7.0 7.0   Sodium 138 137 137   Potassium 4.1 4.5 4.0   CO2 26 29 28   Chloride  106 106 105   BUN 7 11 7   Alk Phos 93 85 107 H   ALT 18 27 25   AST 12 L 16 13 L   Bilirubin, Total 0.3 0.2 0.2     LIPIDS:  Recent Labs   Lab 04/06/21  1650   TSH 1.110     TSH:  Recent Labs   Lab 04/06/21  1650   TSH 1.110     A1C:        Assessment/Plan     Branden Hernandez is a 36 y.o.female with:    1. Allergic rhinitis, unspecified seasonality, unspecified trigger  - montelukast (SINGULAIR) 10 mg tablet; Take 1 tablet (10 mg total) by mouth once daily.  Dispense: 30 tablet; Refill: 3    2. PTSD (post-traumatic stress disorder)    3. Major depressive disorder with current active episode, unspecified depression episode severity, unspecified whether recurrent    4. Insomnia, unspecified type  - traZODone (DESYREL) 100 MG tablet; Take 1 tablet (100 mg total) by mouth every evening.  Dispense: 30 tablet; Refill: 3  - QUEtiapine (SEROQUEL) 50 MG tablet; TAKE 1 TABLET (50 MG TOTAL) BY MOUTH NIGHTLY.  Strength: 50 mg  Dispense: 30 tablet; Refill: 3    5. Panic disorder    6. Other long term (current) drug therapy  - Lipid Panel; Future  - Lipid Panel    7. Rheumatoid arthritis, involving unspecified site, unspecified whether rheumatoid factor present    8. Chronic pain of left knee  - X-Ray Knee 1 or 2 View Left; Future    9. Tobacco dependence syndrome   -diet counseled patient on smoking cessation for 3-10 minutes.  The patient was given the number to the [place] tobacco quit Line.    Ready to quit: No  Counseling given: Yes  Tobacco comments: 5 cigs a day      Chronic conditions status updated as per HPI.  Other than changes above, cont current medications and maintain follow up with specialists.  Return to clinic in 3 months.    Lesa Peterson DO  [facility]

## 2022-09-06 ENCOUNTER — PATIENT MESSAGE (OUTPATIENT)
Dept: FAMILY MEDICINE | Facility: CLINIC | Age: 37
End: 2022-09-06
Payer: MEDICARE

## 2022-09-20 DIAGNOSIS — G47.30 SLEEP APNEA, UNSPECIFIED: Primary | ICD-10-CM

## 2022-10-09 DIAGNOSIS — Z71.89 COMPLEX CARE COORDINATION: ICD-10-CM

## 2022-11-08 ENCOUNTER — PROCEDURE VISIT (OUTPATIENT)
Dept: SLEEP MEDICINE | Facility: HOSPITAL | Age: 37
End: 2022-11-08
Payer: MEDICARE

## 2022-11-08 DIAGNOSIS — G47.30 SLEEP APNEA, UNSPECIFIED: ICD-10-CM

## 2022-11-08 PROCEDURE — 95810 POLYSOM 6/> YRS 4/> PARAM: CPT | Mod: PO
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
## 2022-11-22 ENCOUNTER — OFFICE VISIT (OUTPATIENT)
Dept: NEUROLOGY | Facility: CLINIC | Age: 37
End: 2022-11-22
Payer: MEDICARE

## 2022-11-22 VITALS
HEIGHT: 63 IN | SYSTOLIC BLOOD PRESSURE: 118 MMHG | WEIGHT: 163 LBS | OXYGEN SATURATION: 98 % | BODY MASS INDEX: 28.88 KG/M2 | HEART RATE: 98 BPM | DIASTOLIC BLOOD PRESSURE: 72 MMHG

## 2022-11-22 DIAGNOSIS — E55.9 VITAMIN D DEFICIENCY: ICD-10-CM

## 2022-11-22 DIAGNOSIS — I67.1 INTRACRANIAL ANEURYSM: Chronic | ICD-10-CM

## 2022-11-22 DIAGNOSIS — G47.30 SLEEP APNEA, UNSPECIFIED TYPE: ICD-10-CM

## 2022-11-22 DIAGNOSIS — G43.119 INTRACTABLE MIGRAINE WITH AURA WITHOUT STATUS MIGRAINOSUS: Primary | Chronic | ICD-10-CM

## 2022-11-22 PROBLEM — I42.9 CARDIOMYOPATHY: Status: ACTIVE | Noted: 2022-11-22

## 2022-11-22 PROBLEM — G43.909 MIGRAINE: Status: ACTIVE | Noted: 2022-11-22

## 2022-11-22 PROCEDURE — 99215 OFFICE O/P EST HI 40 MIN: CPT | Mod: PBBFAC | Performed by: NURSE PRACTITIONER

## 2022-11-22 PROCEDURE — 99214 OFFICE O/P EST MOD 30 MIN: CPT | Mod: S$PBB,,, | Performed by: NURSE PRACTITIONER

## 2022-11-22 PROCEDURE — 99214 PR OFFICE/OUTPT VISIT, EST, LEVL IV, 30-39 MIN: ICD-10-PCS | Mod: S$PBB,,, | Performed by: NURSE PRACTITIONER

## 2022-11-22 RX ORDER — PROMETHAZINE HYDROCHLORIDE 25 MG/1
TABLET ORAL
Qty: 30 TABLET | Refills: 3 | Status: SHIPPED | OUTPATIENT
Start: 2022-11-22

## 2022-11-22 NOTE — PATIENT INSTRUCTIONS
1. Keep follow up appointment with Neurosurgeon Dr. Badillo for management of aneurysm with MRA without contrast in 2025  2. Follow up with sleep clinic for sleep study results and recommendations  3. Labs: CBC, CMP  4. Headache diary  5. Keep follow up with headache clinic for Botox, Qulipta, nurtec and ubrevly management  6. Renew and continue phenergan 25 mg, no driving when taking this medication  7. Smoking cessation  8. Keep regular follow up with Rheumatologist  9. Keep regular follow up with PCP  10. Go to ER for any explosive type of headache or change in vision, avoid constipation   11. Continue vitamin D 50,000 IU one capsule monthly   12. Follow up with neurology in 3 months or sooner if needed

## 2022-11-23 ENCOUNTER — TELEPHONE (OUTPATIENT)
Dept: NEUROLOGY | Facility: CLINIC | Age: 37
End: 2022-11-23
Payer: MEDICARE

## 2022-11-23 NOTE — TELEPHONE ENCOUNTER
Pt voiced understanding  ----- Message from Jaya Leigh NP sent at 11/22/2022 12:39 PM CST -----  Please let patient know her sodium is slightly low and her potassium is low at 3.1, alk phos slightly elevated, wbc is lower at 16.21 but still elevated, MCV is slightly elevated, other labs looked good. She needs to follow up with her PCP for these especially the potassium asap thanks.

## 2022-11-30 ENCOUNTER — OFFICE VISIT (OUTPATIENT)
Dept: FAMILY MEDICINE | Facility: CLINIC | Age: 37
End: 2022-11-30
Payer: MEDICARE

## 2022-11-30 VITALS
BODY MASS INDEX: 29.06 KG/M2 | WEIGHT: 164 LBS | OXYGEN SATURATION: 99 % | HEART RATE: 91 BPM | HEIGHT: 63 IN | SYSTOLIC BLOOD PRESSURE: 128 MMHG | DIASTOLIC BLOOD PRESSURE: 68 MMHG

## 2022-11-30 DIAGNOSIS — G47.00 INSOMNIA, UNSPECIFIED TYPE: ICD-10-CM

## 2022-11-30 DIAGNOSIS — D72.829 LEUKOCYTOSIS, UNSPECIFIED TYPE: ICD-10-CM

## 2022-11-30 DIAGNOSIS — F17.200 TOBACCO DEPENDENCE SYNDROME: ICD-10-CM

## 2022-11-30 DIAGNOSIS — J01.10 ACUTE NON-RECURRENT FRONTAL SINUSITIS: Primary | ICD-10-CM

## 2022-11-30 DIAGNOSIS — J30.89 NON-SEASONAL ALLERGIC RHINITIS, UNSPECIFIED TRIGGER: ICD-10-CM

## 2022-11-30 PROBLEM — J30.9 ALLERGIC RHINITIS: Status: ACTIVE | Noted: 2022-11-30

## 2022-11-30 PROCEDURE — 90686 FLU VACCINE (QUAD) GREATER THAN OR EQUAL TO 3YO PRESERVATIVE FREE IM: ICD-10-PCS | Mod: ,,, | Performed by: FAMILY MEDICINE

## 2022-11-30 PROCEDURE — G0008 FLU VACCINE (QUAD) GREATER THAN OR EQUAL TO 3YO PRESERVATIVE FREE IM: ICD-10-PCS | Mod: ,,, | Performed by: FAMILY MEDICINE

## 2022-11-30 PROCEDURE — 99214 PR OFFICE/OUTPT VISIT, EST, LEVL IV, 30-39 MIN: ICD-10-PCS | Mod: ,,, | Performed by: FAMILY MEDICINE

## 2022-11-30 PROCEDURE — G0008 ADMIN INFLUENZA VIRUS VAC: HCPCS | Mod: ,,, | Performed by: FAMILY MEDICINE

## 2022-11-30 PROCEDURE — 99214 OFFICE O/P EST MOD 30 MIN: CPT | Mod: ,,, | Performed by: FAMILY MEDICINE

## 2022-11-30 PROCEDURE — 99406 BEHAV CHNG SMOKING 3-10 MIN: CPT | Mod: ,,, | Performed by: FAMILY MEDICINE

## 2022-11-30 PROCEDURE — 90686 IIV4 VACC NO PRSV 0.5 ML IM: CPT | Mod: ,,, | Performed by: FAMILY MEDICINE

## 2022-11-30 PROCEDURE — 99406 PR TOBACCO USE CESSATION INTERMEDIATE 3-10 MINUTES: ICD-10-PCS | Mod: ,,, | Performed by: FAMILY MEDICINE

## 2022-11-30 RX ORDER — AZITHROMYCIN 250 MG/1
TABLET, FILM COATED ORAL
Qty: 6 TABLET | Refills: 0 | Status: SHIPPED | OUTPATIENT
Start: 2022-11-30 | End: 2023-03-02 | Stop reason: ALTCHOICE

## 2022-11-30 RX ORDER — MONTELUKAST SODIUM 10 MG/1
TABLET ORAL
COMMUNITY
End: 2022-11-30 | Stop reason: SDUPTHER

## 2022-11-30 RX ORDER — CETIRIZINE HYDROCHLORIDE, PSEUDOEPHEDRINE HYDROCHLORIDE 5; 120 MG/1; MG/1
5 TABLET, FILM COATED, EXTENDED RELEASE ORAL EVERY 12 HOURS PRN
Qty: 24 TABLET | Refills: 0 | Status: SHIPPED | OUTPATIENT
Start: 2022-11-30 | End: 2022-12-10

## 2022-11-30 RX ORDER — FLUTICASONE PROPIONATE 50 MCG
2 SPRAY, SUSPENSION (ML) NASAL DAILY
Qty: 48 G | Refills: 1 | Status: SHIPPED | OUTPATIENT
Start: 2022-11-30 | End: 2023-03-02 | Stop reason: SDUPTHER

## 2022-11-30 RX ORDER — TRAZODONE HYDROCHLORIDE 100 MG/1
100 TABLET ORAL NIGHTLY
Qty: 30 TABLET | Refills: 3 | Status: SHIPPED | OUTPATIENT
Start: 2022-11-30 | End: 2023-02-16

## 2022-11-30 RX ORDER — QUETIAPINE FUMARATE 50 MG/1
TABLET, FILM COATED ORAL
Qty: 30 TABLET | Refills: 3 | Status: SHIPPED | OUTPATIENT
Start: 2022-11-30 | End: 2023-03-02 | Stop reason: SDUPTHER

## 2022-11-30 RX ORDER — MONTELUKAST SODIUM 10 MG/1
10 TABLET ORAL NIGHTLY
Qty: 90 TABLET | Refills: 1 | Status: SHIPPED | OUTPATIENT
Start: 2022-11-30 | End: 2023-03-02

## 2022-11-30 RX ORDER — OMEPRAZOLE 20 MG/1
CAPSULE, DELAYED RELEASE ORAL
COMMUNITY
End: 2023-06-05

## 2022-11-30 NOTE — PROGRESS NOTES
[facility]    Chief Complaint      Chief Complaint   Patient presents with    Follow-up     3 month        History of Present Illness      Branden Hernandez is a 37 y.o. female  who presents today for three-month follow-up.    Follow-up  Associated symptoms include congestion. Pertinent negatives include no abdominal pain, chest pain, chills, coughing, fever, headaches, rash, sore throat or weakness.     Past Medical History:  Past Medical History:   Diagnosis Date    Allergic rhinitis 11/30/2022       Past Surgical History:   has a past surgical history that includes Hysterectomy and Laparotomy.    Social History:  Social History     Tobacco Use    Smoking status: Every Day     Years: 20.00     Types: Cigarettes     Start date: 2002    Smokeless tobacco: Never    Tobacco comments:     5 cigs a day   Substance Use Topics    Alcohol use: Never    Drug use: Never       I personally reviewed all past medical, surgical, and social.     Review of Systems   Constitutional:  Negative for chills and fever.   HENT:  Positive for congestion and sinus pain. Negative for ear pain and sore throat.    Eyes:  Negative for blurred vision.   Respiratory:  Negative for cough and shortness of breath.    Cardiovascular:  Negative for chest pain and palpitations.   Gastrointestinal:  Negative for abdominal pain and constipation.   Genitourinary:  Negative for dysuria and hematuria.   Musculoskeletal:  Negative for back pain and falls.   Skin:  Negative for itching and rash.   Neurological:  Negative for weakness and headaches.   Endo/Heme/Allergies:  Negative for polydipsia. Does not bruise/bleed easily.   Psychiatric/Behavioral:  Negative for suicidal ideas. The patient does not have insomnia.       Medications:  Outpatient Encounter Medications as of 11/30/2022   Medication Sig Dispense Refill    atogepant (QULIPTA) 60 mg Tab 60 mg.      BOTOX 200 unit SolR 200 Units.      ergocalciferol (ERGOCALCIFEROL) 50,000 unit Cap  Take one capsule weekly for 12 weeks then reduce to one capsule monthly 12 capsule 1    estradiol valerate (DELESTROGEN) 20 mg/mL injection Inject 20 mg into the muscle every 30 days.      gabapentin (NEURONTIN) 100 MG capsule       ketorolac 0.5% (ACULAR) 0.5 % Drop SMARTSI Drop(s) In Eye(s) 4 Times Daily PRN      omeprazole (PRILOSEC) 20 MG capsule omeprazole 20 mg capsule,delayed release   TAKE ONE CAPSULE BY MOUTH EVERY DAY WITH PREDNISONE FOR 30 DAYS      predniSONE (DELTASONE) 5 MG tablet prednisone 5 mg tablet   TAKE ONE TABLET BY MOUTH EVERY DAY FOR 30 DAYS      prochlorperazine (COMPAZINE) 10 MG tablet prochlorperazine maleate 10 mg tablet   TAKE ONE TABLET BY MOUTH THREE TIMES DAILY      progesterone (PROMETRIUM) 200 MG capsule       promethazine (PHENERGAN) 25 MG tablet Take one tablet every 8 hours as needed for headache or nausea, no more than 2 in a day or 2 days in a week, no driving when taking this medication 30 tablet 3    rimegepant (NURTEC) 75 mg odt Nurtec ODT 75 mg disintegrating tablet      UBRELVY 100 mg tablet Take by mouth.      [DISCONTINUED] montelukast (SINGULAIR) 10 mg tablet montelukast 10 mg tablet   TAKE ONE TABLET BY MOUTH ONCE DAILY      [DISCONTINUED] QUEtiapine (SEROQUEL) 50 MG tablet TAKE 1 TABLET (50 MG TOTAL) BY MOUTH NIGHTLY. Strength: 50 mg 30 tablet 3    [DISCONTINUED] traZODone (DESYREL) 100 MG tablet Take 1 tablet (100 mg total) by mouth every evening. 30 tablet 3    azithromycin (Z-BOUBACAR) 250 MG tablet Take 2 tablets by mouth on day 1; Take 1 tablet by mouth on days 2-5 6 tablet 0    cetirizine-pseudoephedrine 5-120 mg Tb12 Take 5 mg by mouth every 12 (twelve) hours as needed (Congestion). 24 tablet 0    fluticasone propionate (FLONASE) 50 mcg/actuation nasal spray 2 sprays (100 mcg total) by Each Nostril route once daily. 48 g 1    hydrOXYchloroQUINE (PLAQUENIL) 200 mg tablet 200 mg.      hydrOXYzine pamoate (VISTARIL) 25 MG Cap Take 1 capsule (25 mg total) by mouth 2  "(two) times a day. (Patient not taking: Reported on 11/30/2022) 90 capsule 0    montelukast (SINGULAIR) 10 mg tablet Take 1 tablet (10 mg total) by mouth every evening. 90 tablet 1    QUEtiapine (SEROQUEL) 50 MG tablet TAKE 1 TABLET (50 MG TOTAL) BY MOUTH NIGHTLY. Strength: 50 mg 30 tablet 3    traZODone (DESYREL) 100 MG tablet Take 1 tablet (100 mg total) by mouth every evening. 30 tablet 3     No facility-administered encounter medications on file as of 11/30/2022.       Allergies:  Review of patient's allergies indicates:   Allergen Reactions    Latex Other (See Comments)    Penicillins     Levofloxacin Rash       Health Maintenance:  Immunization History   Administered Date(s) Administered    COVID-19, MRNA, LN-S, PF (Pfizer) (Purple Cap) 05/21/2021, 06/11/2021    Influenza - Quadrivalent - PF *Preferred* (6 months and older) 11/30/2022    Pneumococcal Polysaccharide - 23 Valent 10/10/2012    Tdap 07/29/2021      Health Maintenance   Topic Date Due    TETANUS VACCINE  07/29/2031    Lipid Panel  Completed    Hepatitis C Screening  Discontinued        Physical Exam      Vital Signs  Pulse: 91  SpO2: 99 %  BP: 128/68  BP Location: Right arm  Patient Position: Sitting  Height and Weight  Height: 5' 3" (160 cm)  Weight: 74.4 kg (164 lb)  BSA (Calculated - sq m): 1.82 sq meters  BMI (Calculated): 29.1  Weight in (lb) to have BMI = 25: 140.8]    Physical Exam  Vitals and nursing note reviewed.   Constitutional:       Appearance: Normal appearance.   HENT:      Head: Normocephalic.      Right Ear: Ear canal and external ear normal. A middle ear effusion is present. There is no impacted cerumen. Tympanic membrane is bulging.      Left Ear: Ear canal and external ear normal. A middle ear effusion is present. There is no impacted cerumen. Tympanic membrane is bulging.      Nose: Congestion and rhinorrhea present.      Right Turbinates: Enlarged and swollen.      Left Turbinates: Enlarged and swollen.      Right Sinus: " Frontal sinus tenderness present.      Left Sinus: Frontal sinus tenderness present.      Mouth/Throat:      Mouth: Mucous membranes are dry.      Pharynx: Pharyngeal swelling and posterior oropharyngeal erythema present.   Eyes:      General:         Right eye: No discharge.         Left eye: No discharge.      Conjunctiva/sclera: Conjunctivae normal.      Pupils: Pupils are equal, round, and reactive to light.   Cardiovascular:      Rate and Rhythm: Normal rate and regular rhythm.      Pulses: Normal pulses.      Heart sounds: Normal heart sounds. No murmur heard.  Pulmonary:      Effort: Pulmonary effort is normal. No respiratory distress.      Breath sounds: Normal breath sounds.   Musculoskeletal:      Cervical back: Normal range of motion.   Skin:     General: Skin is warm and dry.      Capillary Refill: Capillary refill takes less than 2 seconds.   Neurological:      Mental Status: She is alert and oriented to person, place, and time.      Cranial Nerves: No cranial nerve deficit.      Gait: Gait normal.   Psychiatric:         Mood and Affect: Mood normal.         Behavior: Behavior normal.         Thought Content: Thought content normal.         Judgment: Judgment normal.        Laboratory:  CBC:  Recent Labs   Lab 03/01/22  1039 07/26/22  0935 11/22/22  0942   WBC 10.66 19.31 H 16.21 H   RBC 4.26 4.07 L 4.43   Hemoglobin 13.7 13.3 14.4   Hematocrit 40.6 40.0 42.8   Platelet Count 313 346 328   MCV 95.3 98.3 H 96.6 H   MCH 32.2 H 32.7 H 32.5 H   MCHC 33.7 33.3 33.6     CMP:  Recent Labs   Lab 01/26/22  0938 07/26/22  0935 11/22/22  0942   Glucose 88 90 77   Calcium 8.9 8.7 9.5   Albumin 3.6 3.5 3.5   Total Protein 7.0 7.0 7.5   Sodium 137 137 135 L   Potassium 4.5 4.0 3.1 L   CO2 29 28 27   Chloride 106 105 103   BUN 11 7 6 L   Alk Phos 85 107 H 105 H   ALT 27 25 33   AST 16 13 L 12 L   Bilirubin, Total 0.2 0.2 0.3     LIPIDS:  Recent Labs   Lab 04/06/21  1650 08/31/22  0841   TSH 1.110  --    HDL  Cholesterol  --  39 L   Cholesterol  --  158   Triglycerides  --  137   LDL Calculated  --  92   Cholesterol/HDL Ratio (Risk Factor)  --  4.1   Non-HDL  --  119     TSH:  Recent Labs   Lab 04/06/21  1650   TSH 1.110     A1C:        Assessment/Plan     Branden Hernandez is a 37 y.o.female with:    1. Acute non-recurrent frontal sinusitis  - azithromycin (Z-BOUBACAR) 250 MG tablet; Take 2 tablets by mouth on day 1; Take 1 tablet by mouth on days 2-5  Dispense: 6 tablet; Refill: 0  - cetirizine-pseudoephedrine 5-120 mg Tb12; Take 5 mg by mouth every 12 (twelve) hours as needed (Congestion).  Dispense: 24 tablet; Refill: 0    2. Insomnia, unspecified type  - QUEtiapine (SEROQUEL) 50 MG tablet; TAKE 1 TABLET (50 MG TOTAL) BY MOUTH NIGHTLY. Strength: 50 mg  Dispense: 30 tablet; Refill: 3  - traZODone (DESYREL) 100 MG tablet; Take 1 tablet (100 mg total) by mouth every evening.  Dispense: 30 tablet; Refill: 3    3. Non-seasonal allergic rhinitis, unspecified trigger  - montelukast (SINGULAIR) 10 mg tablet; Take 1 tablet (10 mg total) by mouth every evening.  Dispense: 90 tablet; Refill: 1  - fluticasone propionate (FLONASE) 50 mcg/actuation nasal spray; 2 sprays (100 mcg total) by Each Nostril route once daily.  Dispense: 48 g; Refill: 1    4. Leukocytosis, unspecified type  - Ambulatory referral/consult to Hematology / Oncology; Future    5. Tobacco dependence syndrome [F17.200 (ICD-10-CM)]       Chronic conditions status updated as per HPI.  Other than changes above, cont current medications and maintain follow up with specialists.  Return to clinic in 3 months.    Lesa Peterson DO  [facility]      Tobacco Use/Cessation:  I assessed Branden Hernandez and discussed smoking cessation with her for 3-10 minutes.     Ready to quit: No  Counseling given: Yes  Tobacco comments: 5 cigs a day      Social History     Tobacco Use   Smoking Status Every Day    Years: 20.00    Types: Cigarettes    Start date: 2002   Smokeless  Tobacco Never   Tobacco Comments    5 cigs a day

## 2022-12-01 DIAGNOSIS — I67.1 INTRACRANIAL ANEURYSM: ICD-10-CM

## 2022-12-01 DIAGNOSIS — G43.119 INTRACTABLE MIGRAINE WITH AURA WITHOUT STATUS MIGRAINOSUS: Primary | ICD-10-CM

## 2022-12-08 ENCOUNTER — OFFICE VISIT (OUTPATIENT)
Dept: ORTHOPEDICS | Facility: CLINIC | Age: 37
End: 2022-12-08
Payer: MEDICARE

## 2022-12-08 VITALS — WEIGHT: 164 LBS | HEIGHT: 63 IN | BODY MASS INDEX: 29.06 KG/M2

## 2022-12-08 DIAGNOSIS — M87.88 KNEE PAIN WITH AVASCULAR NECROSIS DETERMINED BY X-RAY: ICD-10-CM

## 2022-12-08 PROCEDURE — 99203 PR OFFICE/OUTPT VISIT, NEW, LEVL III, 30-44 MIN: ICD-10-PCS | Mod: ,,, | Performed by: ORTHOPAEDIC SURGERY

## 2022-12-08 PROCEDURE — 99203 OFFICE O/P NEW LOW 30 MIN: CPT | Mod: ,,, | Performed by: ORTHOPAEDIC SURGERY

## 2022-12-08 NOTE — PATIENT INSTRUCTIONS
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 I have ordered and reviewed her knee xrays today. She really doesn't have the arthritis findings I would expect with the degree of medial joint line tenderness that is present. Typical operative and nonoperative treatment measures were reviewed in great detail today.  Plan is to proceed with an MRI to assess for internal derangement. Will follow-up after study to discuss results. Will reconsider treatment options at that time.     MRI left knee

## 2022-12-08 NOTE — PROGRESS NOTES
ASSESSMENT:      ICD-10-CM ICD-9-CM   1. Knee pain with avascular necrosis determined by x-ray  M87.88 733.49       PLAN:     Findings and treatment options were discussed with the patient regarding the diagnosis.   All questions were answered regarding Branden Hernandez 's painful knee.      Natural history and expected course discussed. Questions answered. Educational materials distributed. Rest, ice, compression, and elevation (RICE) therapy. Reduction in offending activity. MRI.    Patient Instructions   Given the above exam findings, I suspect the patient has AVN and a significant chondral injury. I have ordered and reviewed her knee xrays today. She really doesn't have the arthritis findings I would expect with the degree of medial joint line tenderness that is present. Typical operative and nonoperative treatment measures were reviewed in great detail today.  Plan is to proceed with an MRI to assess for internal derangement. Will follow-up after study to discuss results. Will reconsider treatment options at that time.     MRI left knee    IMAGING:   I reviewed previous x-rays of the left knee which demonstrate changes seen within the left medial tibial plateau and left femoral condyle which may be consistent with a bone infarct versus avascular necrosis.  The joint spaces appear to be well-maintained      CC: Knee pain    37 y.o. Female who presents as a new patient to me for evaluation of  left knee pain.  She is had pain for several years.  She is been told she has avascular necrosis of the left knee and left hip.  She has had an injection back in October which gave her some relief.    Has a hx of Lupus.  Takes chronic steroids.  Has been told she has AVN of the left hip as well.   Occupation: N/A  Pain has been present for several years.  Injury description none.   Mechanical symptoms, such as clicking, locking, and popping are not present.    Swelling and effusions  are present.   The patient does   describe symptoms of knee instability, such as the knee buckling and the knee giving way.   Symptoms are worsened with activity.  Better with rest. Treatment thus far has included rest, activity modifications, and oral medications.    she  has not had formal physical therapy.  she has had prior injections injections into the knee.   she has had previous advanced imaging such as MRI.     Here today to discuss diagnosis and treatment options.           REVIEW OF SYSTEMS:   Constitution: Negative. Negative for chills, fever and night sweats.    Hematologic/Lymphatic: Negative for bleeding problem. Does not bruise/bleed easily.   Skin: Negative for dry skin, itching and rash.   Musculoskeletal: Negative for falls. Positive for knee pain and muscle weakness.     All other review of symptoms were reviewed and found to be noncontributory.     PAST MEDICAL HISTORY:   Past Medical History:   Diagnosis Date    Allergic rhinitis 11/30/2022       PAST SURGICAL HISTORY:   Past Surgical History:   Procedure Laterality Date    HYSTERECTOMY      LAPAROTOMY         FAMILY HISTORY:   Family History   Problem Relation Age of Onset    Diabetes Mother     Bipolar disorder Mother     Hypertension Mother     Bipolar disorder Father     Bipolar disorder Sister     Diabetes Sister     Hypertension Sister     Seizures Brother     Bipolar disorder Brother     Hypertension Brother     Stroke Maternal Grandmother     Cancer Maternal Grandmother        SOCIAL HISTORY:   Social History     Socioeconomic History    Marital status: Single   Occupational History    Occupation: disabled   Tobacco Use    Smoking status: Every Day     Years: 20.00     Types: Cigarettes     Start date: 2002    Smokeless tobacco: Never    Tobacco comments:     5 cigs a day   Substance and Sexual Activity    Alcohol use: Never    Drug use: Never    Sexual activity: Yes     Partners: Male       MEDICATIONS:     Current Outpatient Medications:     atogepant (QULIPTA) 60 mg  Tab, 60 mg., Disp: , Rfl:     azithromycin (Z-BOUBACAR) 250 MG tablet, Take 2 tablets by mouth on day 1; Take 1 tablet by mouth on days 2-5, Disp: 6 tablet, Rfl: 0    BOTOX 200 unit SolR, 200 Units., Disp: , Rfl:     cetirizine-pseudoephedrine 5-120 mg Tb12, Take 5 mg by mouth every 12 (twelve) hours as needed (Congestion)., Disp: 24 tablet, Rfl: 0    ergocalciferol (ERGOCALCIFEROL) 50,000 unit Cap, Take one capsule weekly for 12 weeks then reduce to one capsule monthly, Disp: 12 capsule, Rfl: 1    estradiol valerate (DELESTROGEN) 20 mg/mL injection, Inject 20 mg into the muscle every 30 days., Disp: , Rfl:     fluticasone propionate (FLONASE) 50 mcg/actuation nasal spray, 2 sprays (100 mcg total) by Each Nostril route once daily., Disp: 48 g, Rfl: 1    gabapentin (NEURONTIN) 100 MG capsule, , Disp: , Rfl:     hydrOXYchloroQUINE (PLAQUENIL) 200 mg tablet, 200 mg., Disp: , Rfl:     hydrOXYzine pamoate (VISTARIL) 25 MG Cap, Take 1 capsule (25 mg total) by mouth 2 (two) times a day. (Patient not taking: Reported on 2022), Disp: 90 capsule, Rfl: 0    ketorolac 0.5% (ACULAR) 0.5 % Drop, SMARTSI Drop(s) In Eye(s) 4 Times Daily PRN, Disp: , Rfl:     montelukast (SINGULAIR) 10 mg tablet, Take 1 tablet (10 mg total) by mouth every evening., Disp: 90 tablet, Rfl: 1    omeprazole (PRILOSEC) 20 MG capsule, omeprazole 20 mg capsule,delayed release  TAKE ONE CAPSULE BY MOUTH EVERY DAY WITH PREDNISONE FOR 30 DAYS, Disp: , Rfl:     predniSONE (DELTASONE) 5 MG tablet, prednisone 5 mg tablet  TAKE ONE TABLET BY MOUTH EVERY DAY FOR 30 DAYS, Disp: , Rfl:     prochlorperazine (COMPAZINE) 10 MG tablet, prochlorperazine maleate 10 mg tablet  TAKE ONE TABLET BY MOUTH THREE TIMES DAILY, Disp: , Rfl:     progesterone (PROMETRIUM) 200 MG capsule, , Disp: , Rfl:     promethazine (PHENERGAN) 25 MG tablet, Take one tablet every 8 hours as needed for headache or nausea, no more than 2 in a day or 2 days in a week, no driving when taking this  "medication, Disp: 30 tablet, Rfl: 3    QUEtiapine (SEROQUEL) 50 MG tablet, TAKE 1 TABLET (50 MG TOTAL) BY MOUTH NIGHTLY. Strength: 50 mg, Disp: 30 tablet, Rfl: 3    rimegepant (NURTEC) 75 mg odt, Nurtec ODT 75 mg disintegrating tablet, Disp: , Rfl:     traZODone (DESYREL) 100 MG tablet, Take 1 tablet (100 mg total) by mouth every evening., Disp: 30 tablet, Rfl: 3    UBRELVY 100 mg tablet, Take by mouth., Disp: , Rfl:     ALLERGIES:   Review of patient's allergies indicates:   Allergen Reactions    Latex Other (See Comments)    Penicillins     Levofloxacin Rash        PHYSICAL EXAMINATION:  Ht 5' 3" (1.6 m)   Wt 74.4 kg (164 lb)   BMI 29.05 kg/m²   General    Constitutional: She is oriented to person, place, and time. She appears well-developed and well-nourished.   HENT:   Head: Normocephalic and atraumatic.   Nose: Nose normal.   Eyes: EOM are normal. Pupils are equal, round, and reactive to light.   Cardiovascular:  Normal rate and intact distal pulses.            Pulmonary/Chest: Effort normal. No respiratory distress. She exhibits no tenderness.   Abdominal: Soft. She exhibits no distension. There is no abdominal tenderness.   Neurological: She is alert and oriented to person, place, and time. She has normal reflexes.   Psychiatric: She has a normal mood and affect. Her behavior is normal. Judgment and thought content normal.             Left Knee Exam     Inspection   Swelling: present  Effusion: present  Deformity: absent    Tenderness   The patient tender to palpation of the medial joint line.    Crepitus   The patient has crepitus of the medial joint line.    Range of Motion   Extension:  abnormal   Flexion:  abnormal     Tests   Meniscus   Jaquan:  Medial - positive     Other   Meniscal Cyst: present  Popliteal (Baker's) Cyst: absent    Comments:  Pain with Thessaly Maneuver.       Orders Placed This Encounter   Procedures    MRI Knee Without Contrast Left     Standing Status:   Future     Standing " Expiration Date:   12/8/2023     Order Specific Question:   Does the patient have a pacemaker or a defibrilator (Note: Some facilities may not be able to schedule an MRI for patients with pacemakers and defibrillators. You should contact your local radiology department to determine if this is the case.)?     Answer:   No     Order Specific Question:   Does the patient have an aneurysm or surgical clip, pump, nerve/brain stimulator, middle/inner ear prosthesis, or other metal implant or foreign object (bullet, shrapnel)? If they have a card related to their implant, ask them to bring it. Issues related to the implant may cause the MRI to be delayed.     Answer:   No     Order Specific Question:   Is the patient claustrophobic?     Answer:   No     Order Specific Question:   Will the patient require sedation?     Answer:   No     Order Specific Question:   Does the patient have any of the following conditions? Diabetes, History of Renal Disease or Hypertension requiring medical therapy?     Answer:   No     Order Specific Question:   May the Radiologist modify the order per protocol to meet the clinical needs of the patient?     Answer:   Yes     Order Specific Question:   Is this part of a Research Study?     Answer:   No     Order Specific Question:   Does the patient have on a skin patch for medication with aluminized backing?     Answer:   No       Procedures

## 2022-12-13 ENCOUNTER — HOSPITAL ENCOUNTER (OUTPATIENT)
Dept: RADIOLOGY | Facility: HOSPITAL | Age: 37
Discharge: HOME | End: 2022-12-13
Attending: ORTHOPAEDIC SURGERY
Payer: MEDICARE

## 2022-12-13 DIAGNOSIS — M87.88 KNEE PAIN WITH AVASCULAR NECROSIS DETERMINED BY X-RAY: ICD-10-CM

## 2022-12-13 PROCEDURE — 73721 MRI KNEE WITHOUT CONTRAST LEFT: ICD-10-PCS | Mod: 26,LT,, | Performed by: RADIOLOGY

## 2022-12-13 PROCEDURE — 73721 MRI JNT OF LWR EXTRE W/O DYE: CPT | Mod: 26,LT,, | Performed by: RADIOLOGY

## 2022-12-13 PROCEDURE — 73721 MRI JNT OF LWR EXTRE W/O DYE: CPT | Mod: TC,LT

## 2022-12-15 ENCOUNTER — TELEPHONE (OUTPATIENT)
Dept: FAMILY MEDICINE | Facility: CLINIC | Age: 37
End: 2022-12-15
Payer: MEDICARE

## 2022-12-16 NOTE — PROGRESS NOTES
Bone infarct is present. No Avascular necrosis.  Conservative management only.  Can return to clinic PRN

## 2023-03-02 ENCOUNTER — OFFICE VISIT (OUTPATIENT)
Dept: FAMILY MEDICINE | Facility: CLINIC | Age: 38
End: 2023-03-02
Payer: MEDICARE

## 2023-03-02 VITALS
HEIGHT: 63 IN | SYSTOLIC BLOOD PRESSURE: 122 MMHG | DIASTOLIC BLOOD PRESSURE: 78 MMHG | BODY MASS INDEX: 25.69 KG/M2 | WEIGHT: 145 LBS | HEART RATE: 89 BPM | OXYGEN SATURATION: 99 %

## 2023-03-02 DIAGNOSIS — J30.89 NON-SEASONAL ALLERGIC RHINITIS, UNSPECIFIED TRIGGER: ICD-10-CM

## 2023-03-02 DIAGNOSIS — E87.6 HYPOKALEMIA: ICD-10-CM

## 2023-03-02 DIAGNOSIS — G89.29 CHRONIC LOW BACK PAIN, UNSPECIFIED BACK PAIN LATERALITY, UNSPECIFIED WHETHER SCIATICA PRESENT: ICD-10-CM

## 2023-03-02 DIAGNOSIS — M54.50 CHRONIC LOW BACK PAIN, UNSPECIFIED BACK PAIN LATERALITY, UNSPECIFIED WHETHER SCIATICA PRESENT: ICD-10-CM

## 2023-03-02 DIAGNOSIS — G47.00 INSOMNIA, UNSPECIFIED TYPE: Primary | ICD-10-CM

## 2023-03-02 DIAGNOSIS — Z79.899 OTHER LONG TERM (CURRENT) DRUG THERAPY: ICD-10-CM

## 2023-03-02 PROBLEM — G47.14 HYPERSOMNIA DUE TO MEDICAL CONDITION: Status: ACTIVE | Noted: 2023-03-02

## 2023-03-02 LAB
ANION GAP SERPL CALCULATED.3IONS-SCNC: 7 MMOL/L (ref 7–16)
BUN SERPL-MCNC: 6 MG/DL (ref 7–18)
BUN/CREAT SERPL: 7 (ref 6–20)
CALCIUM SERPL-MCNC: 9.7 MG/DL (ref 8.5–10.1)
CHLORIDE SERPL-SCNC: 108 MMOL/L (ref 98–107)
CO2 SERPL-SCNC: 26 MMOL/L (ref 21–32)
CREAT SERPL-MCNC: 0.82 MG/DL (ref 0.55–1.02)
EGFR (NO RACE VARIABLE) (RUSH/TITUS): 94 ML/MIN/1.73M²
GLUCOSE SERPL-MCNC: 134 MG/DL (ref 74–106)
POTASSIUM SERPL-SCNC: 3.4 MMOL/L (ref 3.5–5.1)
SODIUM SERPL-SCNC: 138 MMOL/L (ref 136–145)

## 2023-03-02 PROCEDURE — 99214 PR OFFICE/OUTPT VISIT, EST, LEVL IV, 30-39 MIN: ICD-10-PCS | Mod: ,,, | Performed by: FAMILY MEDICINE

## 2023-03-02 PROCEDURE — 80048 BASIC METABOLIC PANEL: ICD-10-PCS | Mod: ,,, | Performed by: CLINICAL MEDICAL LABORATORY

## 2023-03-02 PROCEDURE — 99214 OFFICE O/P EST MOD 30 MIN: CPT | Mod: ,,, | Performed by: FAMILY MEDICINE

## 2023-03-02 PROCEDURE — 80048 BASIC METABOLIC PNL TOTAL CA: CPT | Mod: ,,, | Performed by: CLINICAL MEDICAL LABORATORY

## 2023-03-02 RX ORDER — QUETIAPINE FUMARATE 50 MG/1
TABLET, FILM COATED ORAL
Qty: 30 TABLET | Refills: 3 | Status: SHIPPED | OUTPATIENT
Start: 2023-03-02

## 2023-03-02 RX ORDER — MELOXICAM 15 MG/1
1 TABLET ORAL
COMMUNITY
Start: 2022-12-27 | End: 2023-06-05 | Stop reason: SDUPTHER

## 2023-03-02 RX ORDER — LEFLUNOMIDE 10 MG/1
1 TABLET ORAL
COMMUNITY
Start: 2022-10-12

## 2023-03-02 RX ORDER — TIZANIDINE 4 MG/1
4 TABLET ORAL 2 TIMES DAILY
Qty: 60 TABLET | Refills: 3 | Status: SHIPPED | OUTPATIENT
Start: 2023-03-02 | End: 2023-03-12

## 2023-03-02 RX ORDER — FLUTICASONE PROPIONATE 50 MCG
2 SPRAY, SUSPENSION (ML) NASAL DAILY
Qty: 48 G | Refills: 1 | Status: SHIPPED | OUTPATIENT
Start: 2023-03-02 | End: 2023-06-05

## 2023-03-02 RX ORDER — TRAZODONE HYDROCHLORIDE 100 MG/1
100 TABLET ORAL NIGHTLY
Qty: 30 TABLET | Refills: 3 | Status: SHIPPED | OUTPATIENT
Start: 2023-03-02 | End: 2023-06-05

## 2023-03-02 NOTE — PROGRESS NOTES
[facility] Medicine    Chief Complaint      Chief Complaint   Patient presents with    Follow-up     3 month        History of Present Illness      Branden Hernandez is a 37 y.o. female with chronic conditions of arthritis, insomnia, cardiomyopathy, depression, migraines, PTSD, restless leg, sleep apnea, intracranial aneurysm, and allergic rhinitis who presents today for medication refills.     Follow-up  Pertinent negatives include no abdominal pain, chest pain, chills, coughing, fever, headaches, rash, sore throat or weakness.     Past Medical History:  Past Medical History:   Diagnosis Date    Allergic rhinitis 11/30/2022    Cardiomyopathy 11/22/2022    Insomnia 8/31/2022    Intracranial aneurysm 2 mm evaluated by Dr. Badillo 2021 5/12/2021    Formatting of this note might be different from the original. Very small, left cavernous, 2 mm + Family history, aunt on dad's side    MDD (major depressive disorder) 8/31/2022    Migraine 11/22/2022    Osteoarthritis 1/26/2022    PTSD (post-traumatic stress disorder) 8/31/2022    Restless legs 3/1/2022    Sleep apnea 4/6/2021       Past Surgical History:   has a past surgical history that includes Hysterectomy and Laparotomy.    Social History:  Social History     Tobacco Use    Smoking status: Every Day     Packs/day: 1.50     Years: 20.00     Pack years: 30.00     Types: Cigarettes     Start date: 2002    Smokeless tobacco: Never    Tobacco comments:     5 cigs a day   Substance Use Topics    Alcohol use: Never    Drug use: Never       I personally reviewed all past medical, surgical, and social.     Review of Systems   Constitutional:  Negative for chills and fever.   HENT:  Negative for ear pain and sore throat.    Eyes:  Negative for blurred vision.   Respiratory:  Negative for cough and shortness of breath.    Cardiovascular:  Negative for chest pain and palpitations.   Gastrointestinal:  Negative for abdominal pain and constipation.   Genitourinary:  Negative for  dysuria and hematuria.   Musculoskeletal:  Positive for back pain. Negative for falls.   Skin:  Negative for itching and rash.   Neurological:  Negative for weakness and headaches.   Endo/Heme/Allergies:  Negative for polydipsia. Does not bruise/bleed easily.   Psychiatric/Behavioral:  Negative for suicidal ideas. The patient has insomnia.       Medications:  Outpatient Encounter Medications as of 3/2/2023   Medication Sig Dispense Refill    atogepant (QULIPTA) 60 mg Tab 60 mg.      BOTOX 200 unit SolR 200 Units.      ergocalciferol (ERGOCALCIFEROL) 50,000 unit Cap Take one capsule weekly for 12 weeks then reduce to one capsule monthly 12 capsule 1    estradiol valerate (DELESTROGEN) 20 mg/mL injection Inject 20 mg into the muscle every 30 days.      ketorolac 0.5% (ACULAR) 0.5 % Drop SMARTSI Drop(s) In Eye(s) 4 Times Daily PRN      leflunomide (ARAVA) 10 MG Tab 1 tablet.      meloxicam (MOBIC) 15 MG tablet 1 tablet as needed.      omeprazole (PRILOSEC) 20 MG capsule omeprazole 20 mg capsule,delayed release   TAKE ONE CAPSULE BY MOUTH EVERY DAY WITH PREDNISONE FOR 30 DAYS      prochlorperazine (COMPAZINE) 10 MG tablet prochlorperazine maleate 10 mg tablet   TAKE ONE TABLET BY MOUTH THREE TIMES DAILY      promethazine (PHENERGAN) 25 MG tablet Take one tablet every 8 hours as needed for headache or nausea, no more than 2 in a day or 2 days in a week, no driving when taking this medication 30 tablet 3    rimegepant (NURTEC) 75 mg odt Nurtec ODT 75 mg disintegrating tablet      UBRELVY 100 mg tablet Take by mouth.      [DISCONTINUED] fluticasone propionate (FLONASE) 50 mcg/actuation nasal spray 2 sprays (100 mcg total) by Each Nostril route once daily. 48 g 1    [DISCONTINUED] QUEtiapine (SEROQUEL) 50 MG tablet TAKE 1 TABLET (50 MG TOTAL) BY MOUTH NIGHTLY. Strength: 50 mg 30 tablet 3    [DISCONTINUED] traZODone (DESYREL) 100 MG tablet TAKE ONE TABLET BY MOUTH ONCE EVERY EVENING 30 tablet 3    fluticasone propionate  (FLONASE) 50 mcg/actuation nasal spray 2 sprays (100 mcg total) by Each Nostril route once daily. 48 g 1    QUEtiapine (SEROQUEL) 50 MG tablet TAKE 1 TABLET (50 MG TOTAL) BY MOUTH NIGHTLY. Strength: 50 mg 30 tablet 3    tiZANidine (ZANAFLEX) 4 MG tablet Take 1 tablet (4 mg total) by mouth 2 (two) times a day. for 10 days 60 tablet 3    traZODone (DESYREL) 100 MG tablet Take 1 tablet (100 mg total) by mouth every evening. 30 tablet 3    [DISCONTINUED] azithromycin (Z-BOUBACAR) 250 MG tablet Take 2 tablets by mouth on day 1; Take 1 tablet by mouth on days 2-5 6 tablet 0    [DISCONTINUED] gabapentin (NEURONTIN) 100 MG capsule       [DISCONTINUED] hydrOXYchloroQUINE (PLAQUENIL) 200 mg tablet 200 mg.      [DISCONTINUED] hydrOXYzine pamoate (VISTARIL) 25 MG Cap Take 1 capsule (25 mg total) by mouth 2 (two) times a day. (Patient not taking: Reported on 11/30/2022) 90 capsule 0    [DISCONTINUED] montelukast (SINGULAIR) 10 mg tablet Take 1 tablet (10 mg total) by mouth every evening. 90 tablet 1    [DISCONTINUED] predniSONE (DELTASONE) 5 MG tablet prednisone 5 mg tablet   TAKE ONE TABLET BY MOUTH EVERY DAY FOR 30 DAYS      [DISCONTINUED] progesterone (PROMETRIUM) 200 MG capsule       [DISCONTINUED] traZODone (DESYREL) 100 MG tablet Take 1 tablet (100 mg total) by mouth every evening. 30 tablet 3     No facility-administered encounter medications on file as of 3/2/2023.       Allergies:  Review of patient's allergies indicates:   Allergen Reactions    Latex Other (See Comments)    Penicillins     Levofloxacin Rash       Health Maintenance:  Immunization History   Administered Date(s) Administered    COVID-19, MRNA, LN-S, PF (Pfizer) (Purple Cap) 05/21/2021, 06/11/2021    COVID-19, mRNA, LNP-S, bivalent booster, PF (PFIZER OMICRON YEARS 5-11) 12/02/2022    Influenza - Quadrivalent - PF *Preferred* (6 months and older) 11/30/2022    Pneumococcal Polysaccharide - 23 Valent 10/10/2012    Tdap 07/29/2021      Health Maintenance   Topic  "Date Due    TETANUS VACCINE  07/29/2031    Lipid Panel  Completed    Hepatitis C Screening  Discontinued        Physical Exam      Vital Signs  Pulse: 89  SpO2: 99 %  BP: 122/78  BP Location: Right arm  Patient Position: Sitting  Height and Weight  Height: 5' 3" (160 cm)  Weight: 65.8 kg (145 lb)  BSA (Calculated - sq m): 1.71 sq meters  BMI (Calculated): 25.7  Weight in (lb) to have BMI = 25: 140.8]    Physical Exam  Vitals and nursing note reviewed.   Constitutional:       General: She is not in acute distress.     Appearance: Normal appearance.   HENT:      Head: Normocephalic and atraumatic.      Right Ear: External ear normal.      Left Ear: External ear normal.   Eyes:      Extraocular Movements: Extraocular movements intact.      Conjunctiva/sclera: Conjunctivae normal.      Pupils: Pupils are equal, round, and reactive to light.   Cardiovascular:      Rate and Rhythm: Normal rate and regular rhythm.      Heart sounds: Normal heart sounds. No murmur heard.  Pulmonary:      Effort: Pulmonary effort is normal. No respiratory distress.      Breath sounds: Normal breath sounds.   Musculoskeletal:      Left lower leg: No edema.   Skin:     Findings: No rash.   Neurological:      General: No focal deficit present.      Mental Status: She is alert and oriented to person, place, and time. Mental status is at baseline.      Cranial Nerves: No cranial nerve deficit.      Gait: Gait normal.   Psychiatric:         Mood and Affect: Mood normal.         Behavior: Behavior normal.         Thought Content: Thought content normal.         Judgment: Judgment normal.        Laboratory:  CBC:  Recent Labs   Lab 03/01/22  1039 07/26/22  0935 11/22/22  0942   WBC 10.66 19.31 H 16.21 H   RBC 4.26 4.07 L 4.43   Hemoglobin 13.7 13.3 14.4   Hematocrit 40.6 40.0 42.8   Platelet Count 313 346 328   MCV 95.3 98.3 H 96.6 H   MCH 32.2 H 32.7 H 32.5 H   MCHC 33.7 33.3 33.6     CMP:  Recent Labs   Lab 01/26/22  0938 07/26/22  0935 " 11/22/22  0942   Glucose 88 90 77   Calcium 8.9 8.7 9.5   Albumin 3.6 3.5 3.5   Total Protein 7.0 7.0 7.5   Sodium 137 137 135 L   Potassium 4.5 4.0 3.1 L   CO2 29 28 27   Chloride 106 105 103   BUN 11 7 6 L   Alk Phos 85 107 H 105 H   ALT 27 25 33   AST 16 13 L 12 L   Bilirubin, Total 0.2 0.2 0.3     LIPIDS:  Recent Labs   Lab 04/06/21  1650 08/31/22  0841   TSH 1.110  --    HDL Cholesterol  --  39 L   Cholesterol  --  158   Triglycerides  --  137   LDL Calculated  --  92   Cholesterol/HDL Ratio (Risk Factor)  --  4.1   Non-HDL  --  119     TSH:  Recent Labs   Lab 04/06/21  1650   TSH 1.110     A1C:        Assessment/Plan     Branden Hernandez is a 37 y.o.female with:    1. Insomnia, unspecified type  - QUEtiapine (SEROQUEL) 50 MG tablet; TAKE 1 TABLET (50 MG TOTAL) BY MOUTH NIGHTLY. Strength: 50 mg  Dispense: 30 tablet; Refill: 3  - traZODone (DESYREL) 100 MG tablet; Take 1 tablet (100 mg total) by mouth every evening.  Dispense: 30 tablet; Refill: 3    2. Non-seasonal allergic rhinitis, unspecified trigger  - fluticasone propionate (FLONASE) 50 mcg/actuation nasal spray; 2 sprays (100 mcg total) by Each Nostril route once daily.  Dispense: 48 g; Refill: 1    3. Chronic low back pain, unspecified back pain laterality, unspecified whether sciatica present  - new Rx:  tiZANidine (ZANAFLEX) 4 MG tablet; Take 1 tablet (4 mg total) by mouth 2 (two) times a day. for 10 days  Dispense: 60 tablet; Refill: 3    4. Hypokalemia  - Basic Metabolic Panel; Future  - Basic Metabolic Panel    5. Other long term (current) drug therapy  - Basic Metabolic Panel; Future  - Basic Metabolic Panel       Chronic conditions status updated as per HPI.  Other than changes above, cont current medications and maintain follow up with specialists.  Return to clinic in 3 months.    Lesa Peterson DO  [facility]

## 2023-03-06 DIAGNOSIS — E87.6 HYPOKALEMIA: Primary | ICD-10-CM

## 2023-03-06 RX ORDER — POTASSIUM CHLORIDE 750 MG/1
10 TABLET, EXTENDED RELEASE ORAL DAILY
Qty: 90 TABLET | Refills: 1 | Status: SHIPPED | OUTPATIENT
Start: 2023-03-06 | End: 2023-06-05 | Stop reason: SDUPTHER

## 2023-04-13 ENCOUNTER — OFFICE VISIT (OUTPATIENT)
Dept: ORTHOPEDICS | Facility: CLINIC | Age: 38
End: 2023-04-13
Payer: MEDICARE

## 2023-04-13 DIAGNOSIS — M17.12 PRIMARY OSTEOARTHRITIS OF LEFT KNEE: Primary | ICD-10-CM

## 2023-04-13 DIAGNOSIS — M70.62 GREATER TROCHANTERIC BURSITIS OF LEFT HIP: ICD-10-CM

## 2023-04-13 PROCEDURE — 99213 PR OFFICE/OUTPT VISIT, EST, LEVL III, 20-29 MIN: ICD-10-PCS | Mod: S$PBB,25,, | Performed by: ORTHOPAEDIC SURGERY

## 2023-04-13 PROCEDURE — 20610 DRAIN/INJ JOINT/BURSA W/O US: CPT | Mod: PBBFAC | Performed by: ORTHOPAEDIC SURGERY

## 2023-04-13 PROCEDURE — 99212 OFFICE O/P EST SF 10 MIN: CPT | Mod: PBBFAC,25 | Performed by: ORTHOPAEDIC SURGERY

## 2023-04-13 PROCEDURE — 20610 LARGE JOINT ASPIRATION/INJECTION: L GREATER TROCHANTERIC BURSA: ICD-10-PCS | Mod: S$PBB,50,, | Performed by: ORTHOPAEDIC SURGERY

## 2023-04-13 PROCEDURE — 99213 OFFICE O/P EST LOW 20 MIN: CPT | Mod: S$PBB,25,, | Performed by: ORTHOPAEDIC SURGERY

## 2023-04-13 RX ADMIN — TRIAMCINOLONE ACETONIDE 40 MG: 400 INJECTION, SUSPENSION INTRA-ARTICULAR; INTRAMUSCULAR at 02:04

## 2023-04-13 RX ADMIN — BUPIVACAINE HYDROCHLORIDE 3 ML: 5 INJECTION, SOLUTION PERINEURAL at 02:04

## 2023-04-13 RX ADMIN — TRIAMCINOLONE ACETONIDE 80 MG: 400 INJECTION, SUSPENSION INTRA-ARTICULAR; INTRAMUSCULAR at 02:04

## 2023-04-13 NOTE — PROGRESS NOTES
CC:  Knee pain    37 y.o. Female returns to clinic for a follow up visit regarding knee pain.       Pt is here for MRI f/u left knee and treatment moving forward.       Past Medical History:   Diagnosis Date    Allergic rhinitis 11/30/2022    Cardiomyopathy 11/22/2022    Insomnia 8/31/2022    Intracranial aneurysm 2 mm evaluated by Dr. Badillo 2021 5/12/2021    Formatting of this note might be different from the original. Very small, left cavernous, 2 mm + Family history, aunt on dad's side    MDD (major depressive disorder) 8/31/2022    Migraine 11/22/2022    Osteoarthritis 1/26/2022    PTSD (post-traumatic stress disorder) 8/31/2022    Restless legs 3/1/2022    Sleep apnea 4/6/2021     Past Surgical History:   Procedure Laterality Date    HYSTERECTOMY      LAPAROTOMY           PHYSICAL EXAMINATION:  There were no vitals taken for this visit.  General    Nursing note and vitals reviewed.  Constitutional: She is oriented to person, place, and time. She appears well-developed and well-nourished.   HENT:   Head: Normocephalic and atraumatic.   Nose: Nose normal.   Eyes: EOM are normal. Pupils are equal, round, and reactive to light.   Neck: Neck supple.   Cardiovascular:  Normal rate and intact distal pulses.            Pulmonary/Chest: Effort normal. No respiratory distress. She exhibits no tenderness.   Abdominal: Soft. She exhibits no distension. There is no abdominal tenderness.   Neurological: She is alert and oriented to person, place, and time. She has normal reflexes.   Psychiatric: She has a normal mood and affect. Her behavior is normal. Judgment and thought content normal.             Left Knee Exam     Inspection   Swelling: present  Effusion: present  Deformity: absent    Tenderness   The patient tender to palpation of the medial joint line.    Crepitus   The patient has crepitus of the medial joint line.    Range of Motion   Extension:  abnormal   Flexion:  abnormal     Tests   Meniscus   Jaquan:   Medial - positive     Other   Meniscal Cyst: present  Popliteal (Baker's) Cyst: absent    Comments:  Pain with Thessaly Maneuver. Left Hip Exam     Range of Motion   Abduction:  normal   Adduction:  normal   Extension:  normal   Flexion:  normal   External rotation:  normal   Internal rotation: normal     Tests   Pain w/ forced internal rotation (DOLORES): absent  Pain w/ forced external rotation (FADIR): absent    Other   Sensation: normal          Muscle Strength   Left Lower Extremity   Hip Abduction: 4/5       IMAGING:  No results found.     ASSESSMENT:      ICD-10-CM ICD-9-CM   1. Primary osteoarthritis of left knee  M17.12 715.16   2. Greater trochanteric bursitis of left hip  M70.62 726.5       PLAN:     -Findings and treatment options were discussed with the patient  -All questions answered  Natural history and expected course discussed. Questions answered.  Educational materials distributed.  Injections given today.  Please see attached procedure note.  Will follow-up as needed.    There are no Patient Instructions on file for this visit.      Orders Placed This Encounter   Procedures    Large Joint Aspiration/Injection: L supra patellar bursa    Large Joint Aspiration/Injection: L greater trochanteric bursa         Procedures

## 2023-04-13 NOTE — PROCEDURES
Large Joint Aspiration/Injection: L greater trochanteric bursa    Date/Time: 4/13/2023 2:30 PM  Performed by: Matty Lebron MD  Authorized by: Matty Lebron MD     Consent Done?:  Yes (Verbal)  Indications:  Pain  Site marked: the procedure site was marked    Local anesthetic:  Bupivacaine 0.25% without epinephrine    Details:  Needle Size:  22 G  Ultrasonic Guidance for needle placement?: No    Approach:  Lateral  Location:  Hip  Site:  L greater trochanteric bursa  Medications:  80 mg triamcinolone acetonide 40 mg/mL  Patient tolerance:  Patient tolerated the procedure well with no immediate complications

## 2023-04-13 NOTE — PROCEDURES
Large Joint Aspiration/Injection: L supra patellar bursa    Date/Time: 4/13/2023 2:30 PM  Performed by: Matty Lebron MD  Authorized by: Matty Lebron MD     Consent Done?:  Yes (Verbal)  Indications:  Pain  Local anesthesia used?: No      Details:  Needle Size:  22 G  Approach:  Superior  Location:  Knee  Site:  L supra patellar bursa  Medications:  3 mL BUPivacaine 0.5 % (5 mg/mL); 40 mg triamcinolone acetonide 40 mg/mL  Patient tolerance:  Patient tolerated the procedure well with no immediate complications

## 2023-04-14 RX ORDER — TRIAMCINOLONE ACETONIDE 40 MG/ML
40 INJECTION, SUSPENSION INTRA-ARTICULAR; INTRAMUSCULAR
Status: DISCONTINUED | OUTPATIENT
Start: 2023-04-13 | End: 2023-04-14 | Stop reason: HOSPADM

## 2023-04-14 RX ORDER — BUPIVACAINE HYDROCHLORIDE 5 MG/ML
3 INJECTION, SOLUTION PERINEURAL
Status: DISCONTINUED | OUTPATIENT
Start: 2023-04-13 | End: 2023-04-14 | Stop reason: HOSPADM

## 2023-04-14 RX ORDER — TRIAMCINOLONE ACETONIDE 40 MG/ML
80 INJECTION, SUSPENSION INTRA-ARTICULAR; INTRAMUSCULAR
Status: DISCONTINUED | OUTPATIENT
Start: 2023-04-13 | End: 2023-04-14 | Stop reason: HOSPADM

## 2023-05-04 ENCOUNTER — OFFICE VISIT (OUTPATIENT)
Dept: FAMILY MEDICINE | Facility: CLINIC | Age: 38
End: 2023-05-04
Payer: MEDICARE

## 2023-05-04 VITALS
HEART RATE: 84 BPM | WEIGHT: 129 LBS | HEIGHT: 63 IN | SYSTOLIC BLOOD PRESSURE: 134 MMHG | OXYGEN SATURATION: 99 % | BODY MASS INDEX: 22.86 KG/M2 | DIASTOLIC BLOOD PRESSURE: 88 MMHG

## 2023-05-04 DIAGNOSIS — Z79.899 OTHER LONG TERM (CURRENT) DRUG THERAPY: ICD-10-CM

## 2023-05-04 DIAGNOSIS — R25.2 LEG CRAMPS: Primary | ICD-10-CM

## 2023-05-04 LAB
ANION GAP SERPL CALCULATED.3IONS-SCNC: 7 MMOL/L (ref 7–16)
BUN SERPL-MCNC: 8 MG/DL (ref 7–18)
BUN/CREAT SERPL: 12 (ref 6–20)
CALCIUM SERPL-MCNC: 9 MG/DL (ref 8.5–10.1)
CHLORIDE SERPL-SCNC: 105 MMOL/L (ref 98–107)
CO2 SERPL-SCNC: 26 MMOL/L (ref 21–32)
CREAT SERPL-MCNC: 0.69 MG/DL (ref 0.55–1.02)
EGFR (NO RACE VARIABLE) (RUSH/TITUS): 114 ML/MIN/1.73M2
GLUCOSE SERPL-MCNC: 98 MG/DL (ref 74–106)
POTASSIUM SERPL-SCNC: 3.5 MMOL/L (ref 3.5–5.1)
SODIUM SERPL-SCNC: 134 MMOL/L (ref 136–145)

## 2023-05-04 PROCEDURE — 80048 BASIC METABOLIC PNL TOTAL CA: CPT | Mod: ,,, | Performed by: CLINICAL MEDICAL LABORATORY

## 2023-05-04 PROCEDURE — 80048 BASIC METABOLIC PANEL: ICD-10-PCS | Mod: ,,, | Performed by: CLINICAL MEDICAL LABORATORY

## 2023-05-04 PROCEDURE — 99213 PR OFFICE/OUTPT VISIT, EST, LEVL III, 20-29 MIN: ICD-10-PCS | Mod: ,,, | Performed by: FAMILY MEDICINE

## 2023-05-04 PROCEDURE — 99213 OFFICE O/P EST LOW 20 MIN: CPT | Mod: ,,, | Performed by: FAMILY MEDICINE
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
## 2023-05-09 DIAGNOSIS — Z71.89 COMPLEX CARE COORDINATION: ICD-10-CM

## 2023-05-18 ENCOUNTER — HOSPITAL ENCOUNTER (OUTPATIENT)
Dept: RADIOLOGY | Facility: HOSPITAL | Age: 38
Discharge: HOME | End: 2023-05-18
Attending: OBSTETRICS & GYNECOLOGY
Payer: MEDICARE

## 2023-05-18 DIAGNOSIS — N93.9 ABNORMAL UTERINE BLEEDING (AUB): ICD-10-CM

## 2023-05-18 PROCEDURE — 76856 US EXAM PELVIC COMPLETE: CPT | Mod: TC

## 2023-05-18 PROCEDURE — 76856 US EXAM PELVIC COMPLETE: CPT | Mod: 26,,, | Performed by: RADIOLOGY

## 2023-05-18 PROCEDURE — 76856 US PELVIS COMPLETE NON OB: ICD-10-PCS | Mod: 26,,, | Performed by: RADIOLOGY

## 2023-06-05 ENCOUNTER — OFFICE VISIT (OUTPATIENT)
Dept: FAMILY MEDICINE | Facility: CLINIC | Age: 38
End: 2023-06-05
Payer: MEDICARE

## 2023-06-05 VITALS
SYSTOLIC BLOOD PRESSURE: 125 MMHG | RESPIRATION RATE: 100 BRPM | BODY MASS INDEX: 21.09 KG/M2 | HEART RATE: 111 BPM | WEIGHT: 119 LBS | DIASTOLIC BLOOD PRESSURE: 83 MMHG | HEIGHT: 63 IN

## 2023-06-05 DIAGNOSIS — Z79.899 DRUG-INDUCED IMMUNODEFICIENCY: ICD-10-CM

## 2023-06-05 DIAGNOSIS — I72.9 ANEURYSM: ICD-10-CM

## 2023-06-05 DIAGNOSIS — I42.9 CARDIOMYOPATHY, UNSPECIFIED TYPE: ICD-10-CM

## 2023-06-05 DIAGNOSIS — M15.3 OTHER SECONDARY OSTEOARTHRITIS OF MULTIPLE SITES: ICD-10-CM

## 2023-06-05 DIAGNOSIS — E87.6 HYPOKALEMIA: Primary | ICD-10-CM

## 2023-06-05 DIAGNOSIS — M05.79 RHEUMATOID ARTHRITIS INVOLVING MULTIPLE SITES WITH POSITIVE RHEUMATOID FACTOR: ICD-10-CM

## 2023-06-05 DIAGNOSIS — D84.821 DRUG-INDUCED IMMUNODEFICIENCY: ICD-10-CM

## 2023-06-05 PROBLEM — M06.00 SERONEGATIVE RHEUMATOID ARTHRITIS: Status: ACTIVE | Noted: 2022-08-31

## 2023-06-05 PROCEDURE — 99214 PR OFFICE/OUTPT VISIT, EST, LEVL IV, 30-39 MIN: ICD-10-PCS | Mod: ,,, | Performed by: FAMILY MEDICINE

## 2023-06-05 PROCEDURE — 99214 OFFICE O/P EST MOD 30 MIN: CPT | Mod: ,,, | Performed by: FAMILY MEDICINE

## 2023-06-05 RX ORDER — DEXTROAMPHETAMINE SACCHARATE, AMPHETAMINE ASPARTATE, DEXTROAMPHETAMINE SULFATE AND AMPHETAMINE SULFATE 3.75; 3.75; 3.75; 3.75 MG/1; MG/1; MG/1; MG/1
15 TABLET ORAL 2 TIMES DAILY
COMMUNITY
Start: 2023-05-30

## 2023-06-05 RX ORDER — TRAZODONE HYDROCHLORIDE 150 MG/1
150 TABLET ORAL NIGHTLY
COMMUNITY
Start: 2023-05-25 | End: 2023-06-21

## 2023-06-05 RX ORDER — POTASSIUM CHLORIDE 750 MG/1
10 TABLET, EXTENDED RELEASE ORAL DAILY
Qty: 90 TABLET | Refills: 1 | Status: SHIPPED | OUTPATIENT
Start: 2023-06-05

## 2023-06-05 RX ORDER — MELOXICAM 15 MG/1
15 TABLET ORAL DAILY
Qty: 90 TABLET | Refills: 1 | Status: SHIPPED | OUTPATIENT
Start: 2023-06-05

## 2023-06-05 NOTE — PROGRESS NOTES
Tobacco Use/Cessation:  I assessed Branden Hernandez and discussed smoking cessation with her for 3-10 minutes. She reports that she has been smoking cigarettes. She started smoking about 21 years ago. She has a 30.00 pack-year smoking history. She has never used smokeless tobacco.

## 2023-06-05 NOTE — PROGRESS NOTES
[facility]    Chief Complaint      Chief Complaint   Patient presents with    Follow-up     3 mth f/u   Pt is taking medical cannabis       History of Present Illness      Branden Hernandez is a 37 y.o. female with chronic conditions of  arthritis, insomnia, cardiomyopathy, depression, migraines, PTSD, restless leg, sleep apnea, intracranial aneurysm, and allergic rhinitis who presents today for 3 month follow-up.    HPI    Past Medical History:  Past Medical History:   Diagnosis Date    Allergic rhinitis 11/30/2022    Cardiomyopathy 11/22/2022    Insomnia 8/31/2022    Intracranial aneurysm 2 mm evaluated by Dr. Badillo 2021 5/12/2021    Formatting of this note might be different from the original. Very small, left cavernous, 2 mm + Family history, aunt on dad's side    MDD (major depressive disorder) 8/31/2022    Migraine 11/22/2022    Osteoarthritis 1/26/2022    PTSD (post-traumatic stress disorder) 8/31/2022    Restless legs 3/1/2022    Sleep apnea 4/6/2021       Past Surgical History:   has a past surgical history that includes Hysterectomy and Laparotomy.    Social History:  Social History     Tobacco Use    Smoking status: Every Day     Packs/day: 1.50     Years: 20.00     Pack years: 30.00     Types: Cigarettes     Start date: 2002    Smokeless tobacco: Never    Tobacco comments:     5 cigs a day   Substance Use Topics    Alcohol use: Never    Drug use: Never       I personally reviewed all past medical, surgical, and social.     Review of Systems   Constitutional:  Negative for chills and fever.   HENT:  Negative for ear pain and sore throat.    Eyes:  Negative for blurred vision.   Respiratory:  Negative for cough and shortness of breath.    Cardiovascular:  Negative for chest pain and palpitations.   Gastrointestinal:  Negative for abdominal pain and constipation.   Genitourinary:  Negative for dysuria and hematuria.   Musculoskeletal:  Negative for back pain and falls.   Skin:  Negative for  itching and rash.   Neurological:  Negative for weakness and headaches.   Endo/Heme/Allergies:  Negative for polydipsia. Does not bruise/bleed easily.   Psychiatric/Behavioral:  Negative for suicidal ideas. The patient does not have insomnia.       Medications:  Outpatient Encounter Medications as of 2023   Medication Sig Dispense Refill    atogepant (QULIPTA) 60 mg Tab 60 mg.      BOTOX 200 unit SolR 200 Units.      dextroamphetamine-amphetamine (ADDERALL) 15 mg tablet Take 15 mg by mouth 2 (two) times daily.      ergocalciferol (ERGOCALCIFEROL) 50,000 unit Cap Take one capsule weekly for 12 weeks then reduce to one capsule monthly 12 capsule 1    estradiol valerate (DELESTROGEN) 20 mg/mL injection Inject 20 mg into the muscle every 30 days.      ketorolac 0.5% (ACULAR) 0.5 % Drop SMARTSI Drop(s) In Eye(s) 4 Times Daily PRN      prochlorperazine (COMPAZINE) 10 MG tablet prochlorperazine maleate 10 mg tablet   TAKE ONE TABLET BY MOUTH THREE TIMES DAILY      promethazine (PHENERGAN) 25 MG tablet Take one tablet every 8 hours as needed for headache or nausea, no more than 2 in a day or 2 days in a week, no driving when taking this medication 30 tablet 3    QUEtiapine (SEROQUEL) 50 MG tablet TAKE 1 TABLET (50 MG TOTAL) BY MOUTH NIGHTLY. Strength: 50 mg 30 tablet 3    rimegepant (NURTEC) 75 mg odt Nurtec ODT 75 mg disintegrating tablet      traZODone (DESYREL) 150 MG tablet Take 150 mg by mouth every evening.      UBRELVY 100 mg tablet Take by mouth.      [DISCONTINUED] potassium chloride SA (K-DUR,KLOR-CON M) 10 MEQ tablet Take 1 tablet (10 mEq total) by mouth once daily. 90 tablet 1    leflunomide (ARAVA) 10 MG Tab 1 tablet.      meloxicam (MOBIC) 15 MG tablet Take 1 tablet (15 mg total) by mouth once daily. 90 tablet 1    montelukast (SINGULAIR) 10 mg tablet TAKE ONE TABLET BY MOUTH ONCE DAILY (Patient not taking: Reported on 2023) 30 tablet 3    potassium chloride SA (K-DUR,KLOR-CON M) 10 MEQ tablet Take  "1 tablet (10 mEq total) by mouth once daily. 90 tablet 1    [DISCONTINUED] fluticasone propionate (FLONASE) 50 mcg/actuation nasal spray 2 sprays (100 mcg total) by Each Nostril route once daily. (Patient not taking: Reported on 6/5/2023) 48 g 1    [DISCONTINUED] meloxicam (MOBIC) 15 MG tablet 1 tablet as needed.      [DISCONTINUED] omeprazole (PRILOSEC) 20 MG capsule omeprazole 20 mg capsule,delayed release   TAKE ONE CAPSULE BY MOUTH EVERY DAY WITH PREDNISONE FOR 30 DAYS      [DISCONTINUED] traZODone (DESYREL) 100 MG tablet Take 1 tablet (100 mg total) by mouth every evening. (Patient not taking: Reported on 6/5/2023) 30 tablet 3     No facility-administered encounter medications on file as of 6/5/2023.       Allergies:  Review of patient's allergies indicates:   Allergen Reactions    Arava [leflunomide]     Enbrel [etanercept]     Latex Other (See Comments)    Penicillins     Levofloxacin Rash       Health Maintenance:  Immunization History   Administered Date(s) Administered    COVID-19, MRNA, LN-S, PF (Pfizer) (Purple Cap) 05/21/2021, 06/11/2021    COVID-19, mRNA, LNP-S, bivalent booster, PF (PFIZER OMICRON YEARS 5-11) 12/02/2022    Influenza - Quadrivalent - PF *Preferred* (6 months and older) 11/30/2022    Pneumococcal Polysaccharide - 23 Valent 10/10/2012    Tdap 07/29/2021      Health Maintenance   Topic Date Due    TETANUS VACCINE  07/29/2031    Lipid Panel  Completed    Hepatitis C Screening  Discontinued        Physical Exam      Vital Signs  Pulse: (!) 111  Resp: (!) 100  BP: 125/83  Height and Weight  Height: 5' 3" (160 cm)  Weight: 54 kg (119 lb)  BSA (Calculated - sq m): 1.55 sq meters  BMI (Calculated): 21.1  Weight in (lb) to have BMI = 25: 140.8]    Physical Exam  Constitutional:       Appearance: Normal appearance.   HENT:      Head: Normocephalic and atraumatic.   Cardiovascular:      Rate and Rhythm: Normal rate and regular rhythm.      Heart sounds: Normal heart sounds.   Pulmonary:      " Effort: Pulmonary effort is normal.      Breath sounds: Normal breath sounds.   Skin:     Findings: No rash.   Neurological:      General: No focal deficit present.      Mental Status: She is alert and oriented to person, place, and time.      Gait: Gait normal.   Psychiatric:         Mood and Affect: Mood normal.         Behavior: Behavior normal.         Thought Content: Thought content normal.         Judgment: Judgment normal.        Laboratory:  CBC:  Recent Labs   Lab 03/01/22  1039 07/26/22  0935 11/22/22  0942   WBC 10.66 19.31 H 16.21 H   RBC 4.26 4.07 L 4.43   Hemoglobin 13.7 13.3 14.4   Hematocrit 40.6 40.0 42.8   Platelet Count 313 346 328   MCV 95.3 98.3 H 96.6 H   MCH 32.2 H 32.7 H 32.5 H   MCHC 33.7 33.3 33.6     CMP:  Recent Labs   Lab 01/26/22  0938 07/26/22  0935 11/22/22  0942 03/02/23  1040 05/04/23  1000   Glucose 88 90 77   < > 98   Calcium 8.9 8.7 9.5   < > 9.0   Albumin 3.6 3.5 3.5  --   --    Total Protein 7.0 7.0 7.5  --   --    Sodium 137 137 135 L   < > 134 L   Potassium 4.5 4.0 3.1 L   < > 3.5   CO2 29 28 27   < > 26   Chloride 106 105 103   < > 105   BUN 11 7 6 L   < > 8   Alk Phos 85 107 H 105 H  --   --    ALT 27 25 33  --   --    AST 16 13 L 12 L  --   --    Bilirubin, Total 0.2 0.2 0.3  --   --     < > = values in this interval not displayed.     LIPIDS:  Recent Labs   Lab 04/06/21  1650 08/31/22  0841   TSH 1.110  --    HDL Cholesterol  --  39 L   Cholesterol  --  158   Triglycerides  --  137   LDL Calculated  --  92   Cholesterol/HDL Ratio (Risk Factor)  --  4.1   Non-HDL  --  119     TSH:  Recent Labs   Lab 04/06/21  1650   TSH 1.110     A1C:        Assessment/Plan     Branden Hernandez is a 37 y.o.female with:    1. Hypokalemia  - potassium chloride SA (K-DUR,KLOR-CON M) 10 MEQ tablet; Take 1 tablet (10 mEq total) by mouth once daily.  Dispense: 90 tablet; Refill: 1    2. Other secondary osteoarthritis of multiple sites  - meloxicam (MOBIC) 15 MG tablet; Take 1 tablet (15 mg  total) by mouth once daily.  Dispense: 90 tablet; Refill: 1    3. Drug-induced immunodeficiency    4. Rheumatoid arthritis involving multiple sites with positive rheumatoid factor    5. Cardiomyopathy, unspecified type    6. Aneurysm     The current medical regimen is effective;  continue present plan and medications.    Chronic conditions status updated as per HPI.  Other than changes above, cont current medications and maintain follow up with specialists.  Return to clinic in 3 months.    Lesa Peterson DO  [facility]      Tobacco Use/Cessation:  I assessed Branden Hernandez and discussed smoking cessation with her for 3-10 minutes. She reports that she has been smoking cigarettes. She started smoking about 21 years ago. She has a 30.00 pack-year smoking history. She has never used smokeless tobacco.

## 2023-06-16 ENCOUNTER — HOSPITAL ENCOUNTER (EMERGENCY)
Facility: HOSPITAL | Age: 38
Discharge: HOME | End: 2023-06-16
Attending: EMERGENCY MEDICINE
Payer: MEDICARE

## 2023-06-16 VITALS
DIASTOLIC BLOOD PRESSURE: 88 MMHG | TEMPERATURE: 99 F | SYSTOLIC BLOOD PRESSURE: 141 MMHG | RESPIRATION RATE: 16 BRPM | WEIGHT: 105 LBS | HEIGHT: 63 IN | BODY MASS INDEX: 18.61 KG/M2 | OXYGEN SATURATION: 100 % | HEART RATE: 113 BPM

## 2023-06-16 DIAGNOSIS — S80.02XA CONTUSION OF LEFT KNEE, INITIAL ENCOUNTER: Primary | ICD-10-CM

## 2023-06-16 DIAGNOSIS — S40.029A CONTUSION OF UPPER EXTREMITY, UNSPECIFIED LATERALITY, INITIAL ENCOUNTER: ICD-10-CM

## 2023-06-16 DIAGNOSIS — S10.93XA CONTUSION OF NECK, INITIAL ENCOUNTER: ICD-10-CM

## 2023-06-16 DIAGNOSIS — F43.21 GRIEF REACTION: ICD-10-CM

## 2023-06-16 PROCEDURE — 63600175 PHARM REV CODE 636 W HCPCS: Performed by: EMERGENCY MEDICINE

## 2023-06-16 PROCEDURE — 99283 EMERGENCY DEPT VISIT LOW MDM: CPT | Mod: ,,, | Performed by: EMERGENCY MEDICINE

## 2023-06-16 PROCEDURE — 99284 EMERGENCY DEPT VISIT MOD MDM: CPT

## 2023-06-16 PROCEDURE — 96372 THER/PROPH/DIAG INJ SC/IM: CPT | Performed by: EMERGENCY MEDICINE

## 2023-06-16 PROCEDURE — 99283 PR EMERGENCY DEPT VISIT,LEVEL III: ICD-10-PCS | Mod: ,,, | Performed by: EMERGENCY MEDICINE

## 2023-06-16 RX ORDER — KETOROLAC TROMETHAMINE 30 MG/ML
60 INJECTION, SOLUTION INTRAMUSCULAR; INTRAVENOUS
Status: COMPLETED | OUTPATIENT
Start: 2023-06-16 | End: 2023-06-16

## 2023-06-16 RX ADMIN — KETOROLAC TROMETHAMINE 60 MG: 60 INJECTION, SOLUTION INTRAMUSCULAR at 05:06

## 2023-06-16 NOTE — DISCHARGE INSTRUCTIONS
Use ice pack as needed.  Use ibuprofen and Tylenol as needed.  Reach out to positive family and social contacts for support    Follow-up with primary care as needed.  Return the ER if symptoms are worsening or new symptoms develop
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
## 2023-06-21 ENCOUNTER — OFFICE VISIT (OUTPATIENT)
Dept: FAMILY MEDICINE | Facility: CLINIC | Age: 38
End: 2023-06-21
Payer: MEDICARE

## 2023-06-21 ENCOUNTER — APPOINTMENT (OUTPATIENT)
Dept: RADIOLOGY | Facility: CLINIC | Age: 38
End: 2023-06-21
Attending: FAMILY MEDICINE
Payer: MEDICARE

## 2023-06-21 VITALS
TEMPERATURE: 98 F | RESPIRATION RATE: 18 BRPM | DIASTOLIC BLOOD PRESSURE: 78 MMHG | BODY MASS INDEX: 20.55 KG/M2 | OXYGEN SATURATION: 98 % | HEART RATE: 83 BPM | WEIGHT: 116 LBS | HEIGHT: 63 IN | SYSTOLIC BLOOD PRESSURE: 110 MMHG

## 2023-06-21 DIAGNOSIS — M25.512 ACUTE PAIN OF LEFT SHOULDER: ICD-10-CM

## 2023-06-21 DIAGNOSIS — M54.50 CHRONIC MIDLINE LOW BACK PAIN WITHOUT SCIATICA: ICD-10-CM

## 2023-06-21 DIAGNOSIS — M54.2 NECK PAIN: Primary | ICD-10-CM

## 2023-06-21 DIAGNOSIS — G89.29 CHRONIC MIDLINE LOW BACK PAIN WITHOUT SCIATICA: ICD-10-CM

## 2023-06-21 DIAGNOSIS — R53.83 FATIGUE, UNSPECIFIED TYPE: ICD-10-CM

## 2023-06-21 LAB
ALBUMIN SERPL BCP-MCNC: 3.6 G/DL (ref 3.5–5)
ALBUMIN/GLOB SERPL: 1.2 {RATIO}
ALP SERPL-CCNC: 100 U/L (ref 37–98)
ALT SERPL W P-5'-P-CCNC: 36 U/L (ref 13–56)
ANION GAP SERPL CALCULATED.3IONS-SCNC: 8 MMOL/L (ref 7–16)
AST SERPL W P-5'-P-CCNC: 14 U/L (ref 15–37)
BASOPHILS # BLD AUTO: 0.05 K/UL (ref 0–0.2)
BASOPHILS NFR BLD AUTO: 0.6 % (ref 0–1)
BILIRUB SERPL-MCNC: 0.2 MG/DL (ref ?–1.2)
BUN SERPL-MCNC: 11 MG/DL (ref 7–18)
BUN/CREAT SERPL: 15 (ref 6–20)
CALCIUM SERPL-MCNC: 9.3 MG/DL (ref 8.5–10.1)
CHLORIDE SERPL-SCNC: 107 MMOL/L (ref 98–107)
CO2 SERPL-SCNC: 30 MMOL/L (ref 21–32)
CREAT SERPL-MCNC: 0.74 MG/DL (ref 0.55–1.02)
DIFFERENTIAL METHOD BLD: ABNORMAL
EGFR (NO RACE VARIABLE) (RUSH/TITUS): 106 ML/MIN/1.73M2
EOSINOPHIL # BLD AUTO: 0.18 K/UL (ref 0–0.5)
EOSINOPHIL NFR BLD AUTO: 2 % (ref 1–4)
ERYTHROCYTE [DISTWIDTH] IN BLOOD BY AUTOMATED COUNT: 14.2 % (ref 11.5–14.5)
GLOBULIN SER-MCNC: 2.9 G/DL (ref 2–4)
GLUCOSE SERPL-MCNC: 80 MG/DL (ref 74–106)
HCT VFR BLD AUTO: 43.4 % (ref 38–47)
HGB BLD-MCNC: 14 G/DL (ref 12–16)
IMM GRANULOCYTES # BLD AUTO: 0.02 K/UL (ref 0–0.04)
IMM GRANULOCYTES NFR BLD: 0.2 % (ref 0–0.4)
LYMPHOCYTES # BLD AUTO: 2.31 K/UL (ref 1–4.8)
LYMPHOCYTES NFR BLD AUTO: 26 % (ref 27–41)
MACROCYTES BLD QL SMEAR: ABNORMAL
MCH RBC QN AUTO: 35 PG (ref 27–31)
MCHC RBC AUTO-ENTMCNC: 32.3 G/DL (ref 32–36)
MCV RBC AUTO: 108.5 FL (ref 80–96)
MONOCYTES # BLD AUTO: 0.7 K/UL (ref 0–0.8)
MONOCYTES NFR BLD AUTO: 7.9 % (ref 2–6)
MPC BLD CALC-MCNC: 9.9 FL (ref 9.4–12.4)
NEUTROPHILS # BLD AUTO: 5.63 K/UL (ref 1.8–7.7)
NEUTROPHILS NFR BLD AUTO: 63.3 % (ref 53–65)
NRBC # BLD AUTO: 0 X10E3/UL
NRBC, AUTO (.00): 0 %
PLATELET # BLD AUTO: 274 K/UL (ref 150–400)
PLATELET MORPHOLOGY: ABNORMAL
POTASSIUM SERPL-SCNC: 4.2 MMOL/L (ref 3.5–5.1)
PROT SERPL-MCNC: 6.5 G/DL (ref 6.4–8.2)
RBC # BLD AUTO: 4 M/UL (ref 4.2–5.4)
SODIUM SERPL-SCNC: 141 MMOL/L (ref 136–145)
WBC # BLD AUTO: 8.89 K/UL (ref 4.5–11)

## 2023-06-21 PROCEDURE — 73030 XR SHOULDER COMPLETE 2 OR MORE VIEWS LEFT: ICD-10-PCS | Mod: 26,LT,, | Performed by: RADIOLOGY

## 2023-06-21 PROCEDURE — 85025 CBC WITH DIFFERENTIAL: ICD-10-PCS | Mod: ,,, | Performed by: CLINICAL MEDICAL LABORATORY

## 2023-06-21 PROCEDURE — 99213 OFFICE O/P EST LOW 20 MIN: CPT | Mod: ,,, | Performed by: FAMILY MEDICINE

## 2023-06-21 PROCEDURE — 99213 PR OFFICE/OUTPT VISIT, EST, LEVL III, 20-29 MIN: ICD-10-PCS | Mod: ,,, | Performed by: FAMILY MEDICINE

## 2023-06-21 PROCEDURE — 73030 X-RAY EXAM OF SHOULDER: CPT | Mod: 26,LT,, | Performed by: RADIOLOGY

## 2023-06-21 PROCEDURE — 80053 COMPREHEN METABOLIC PANEL: CPT | Mod: ,,, | Performed by: CLINICAL MEDICAL LABORATORY

## 2023-06-21 PROCEDURE — 85025 COMPLETE CBC W/AUTO DIFF WBC: CPT | Mod: ,,, | Performed by: CLINICAL MEDICAL LABORATORY

## 2023-06-21 PROCEDURE — 73030 X-RAY EXAM OF SHOULDER: CPT | Mod: TC,RHCUB,LT | Performed by: FAMILY MEDICINE

## 2023-06-21 PROCEDURE — 80053 COMPREHENSIVE METABOLIC PANEL: ICD-10-PCS | Mod: ,,, | Performed by: CLINICAL MEDICAL LABORATORY

## 2023-06-21 RX ORDER — FLUTICASONE PROPIONATE 50 MCG
SPRAY, SUSPENSION (ML) NASAL
COMMUNITY
Start: 2023-06-15

## 2023-06-21 RX ORDER — TIZANIDINE 4 MG/1
4 TABLET ORAL 2 TIMES DAILY PRN
COMMUNITY
Start: 2023-05-30

## 2023-06-21 RX ORDER — TRAZODONE HYDROCHLORIDE 100 MG/1
100 TABLET ORAL NIGHTLY
COMMUNITY
Start: 2023-06-15

## 2023-06-21 NOTE — PROGRESS NOTES
Branden Hernandez is a 37 y.o. female seen today for her initial visit since 2017.  Patient has an extensive past medical history to include seronegative lupus erythematosus fibromyalgia chronic anxiety, PTSD, depression and attention deficit disorder.  Patient also suffers from chronic insomnia and reports poor quality of sleep.  Patient recently tripped over their pet dog and impacted her left shoulder and spring her neck and lower back and is requesting x-rays.  Patient is currently managed through pain management via medical marijuana as well as a psychiatric specialist Dr. Scott.  Her follow-up with Rheumatology, Dr. Wharton is pending in September.      Past Medical History:   Diagnosis Date    Allergic rhinitis 11/30/2022    Cardiomyopathy 11/22/2022    Insomnia 8/31/2022    Intracranial aneurysm 2 mm evaluated by Dr. Badillo 2021 5/12/2021    Formatting of this note might be different from the original. Very small, left cavernous, 2 mm + Family history, aunt on dad's side    MDD (major depressive disorder) 8/31/2022    Migraine 11/22/2022    Osteoarthritis 1/26/2022    PTSD (post-traumatic stress disorder) 8/31/2022    Restless legs 3/1/2022    Sleep apnea 4/6/2021     Family History   Problem Relation Age of Onset    Diabetes Mother     Bipolar disorder Mother     Hypertension Mother     Bipolar disorder Father     Bipolar disorder Sister     Diabetes Sister     Hypertension Sister     Seizures Brother     Bipolar disorder Brother     Hypertension Brother     Stroke Maternal Grandmother     Cancer Maternal Grandmother      Current Outpatient Medications on File Prior to Visit   Medication Sig Dispense Refill    atogepant (QULIPTA) 60 mg Tab 60 mg.      BOTOX 200 unit SolR 200 Units.      dextroamphetamine-amphetamine (ADDERALL) 15 mg tablet Take 15 mg by mouth 2 (two) times daily.      ergocalciferol (ERGOCALCIFEROL) 50,000 unit Cap Take one capsule weekly for 12 weeks then reduce to one capsule monthly 12  capsule 1    estradiol valerate (DELESTROGEN) 20 mg/mL injection Inject 20 mg into the muscle every 30 days.      fluticasone propionate (FLONASE) 50 mcg/actuation nasal spray by Each Nostril route.      ketorolac 0.5% (ACULAR) 0.5 % Drop SMARTSI Drop(s) In Eye(s) 4 Times Daily PRN      leflunomide (ARAVA) 10 MG Tab 1 tablet.      meloxicam (MOBIC) 15 MG tablet Take 1 tablet (15 mg total) by mouth once daily. 90 tablet 1    montelukast (SINGULAIR) 10 mg tablet TAKE ONE TABLET BY MOUTH ONCE DAILY 30 tablet 3    potassium chloride SA (K-DUR,KLOR-CON M) 10 MEQ tablet Take 1 tablet (10 mEq total) by mouth once daily. 90 tablet 1    prochlorperazine (COMPAZINE) 10 MG tablet prochlorperazine maleate 10 mg tablet   TAKE ONE TABLET BY MOUTH THREE TIMES DAILY      promethazine (PHENERGAN) 25 MG tablet Take one tablet every 8 hours as needed for headache or nausea, no more than 2 in a day or 2 days in a week, no driving when taking this medication 30 tablet 3    QUEtiapine (SEROQUEL) 50 MG tablet TAKE 1 TABLET (50 MG TOTAL) BY MOUTH NIGHTLY. Strength: 50 mg 30 tablet 3    rimegepant (NURTEC) 75 mg odt Nurtec ODT 75 mg disintegrating tablet      tiZANidine (ZANAFLEX) 4 MG tablet Take 4 mg by mouth 2 (two) times daily as needed.      traZODone (DESYREL) 100 MG tablet Take 100 mg by mouth every evening.      UBRELVY 100 mg tablet Take by mouth.      [DISCONTINUED] traZODone (DESYREL) 150 MG tablet Take 150 mg by mouth every evening.       No current facility-administered medications on file prior to visit.     Immunization History   Administered Date(s) Administered    COVID-19, MRNA, LN-S, PF (Pfizer) (Purple Cap) 2021, 2021    COVID-19, mRNA, LNP-S, bivalent booster, PF (PFIZER OMICRON YEARS 5-11) 2022    Influenza - Quadrivalent - PF *Preferred* (6 months and older) 2022    Pneumococcal Polysaccharide - 23 Valent 10/10/2012    Tdap 2021       Review of Systems   Constitutional:  Positive for  malaise/fatigue. Negative for fever and weight loss.   Respiratory:  Negative for shortness of breath.    Cardiovascular:  Negative for chest pain and palpitations.   Gastrointestinal:  Negative for nausea and vomiting.   Musculoskeletal:  Positive for back pain, falls, joint pain, myalgias and neck pain.   Psychiatric/Behavioral:  Positive for depression. The patient is nervous/anxious and has insomnia.       Vitals:    06/21/23 1318   BP: 110/78   Pulse: 83   Resp: 18   Temp: 97.9 °F (36.6 °C)       Physical Exam  Vitals reviewed.   Constitutional:       Appearance: Normal appearance.   HENT:      Head: Normocephalic.   Eyes:      Extraocular Movements: Extraocular movements intact.      Conjunctiva/sclera: Conjunctivae normal.      Pupils: Pupils are equal, round, and reactive to light.   Neck:      Thyroid: No thyroid mass or thyromegaly.   Cardiovascular:      Rate and Rhythm: Normal rate and regular rhythm.      Heart sounds: Normal heart sounds. No murmur heard.    No gallop.   Pulmonary:      Effort: Pulmonary effort is normal. No respiratory distress.      Breath sounds: Normal breath sounds. No wheezing or rales.   Musculoskeletal:      Cervical back: Spasms and tenderness present. Decreased range of motion.      Lumbar back: Spasms and tenderness present.        Back:    Skin:     General: Skin is warm and dry.      Coloration: Skin is not jaundiced or pale.   Neurological:      Mental Status: She is alert.   Psychiatric:         Mood and Affect: Mood normal.         Behavior: Behavior normal.         Thought Content: Thought content normal.         Judgment: Judgment normal.        Assessment and Plan  Neck pain  -     X-Ray Cervical Spine AP And Lateral; Future; Expected date: 06/21/2023    Fatigue, unspecified type  -     CBC Auto Differential; Future; Expected date: 06/21/2023  -     Comprehensive Metabolic Panel; Future; Expected date: 06/21/2023    Chronic midline low back pain without sciatica  -      X-Ray Lumbar Spine Ap And Lateral; Future; Expected date: 06/21/2023    Acute pain of left shoulder  -     X-Ray Shoulder 2 or More Views Left; Future; Expected date: 06/21/2023            Return to clinic in 2 months or as needed once her x-rays and labs are in.    Health Maintenance Topics with due status: Not Due       Topic Last Completion Date    TETANUS VACCINE 07/29/2021

## 2023-06-23 ENCOUNTER — TELEPHONE (OUTPATIENT)
Dept: FAMILY MEDICINE | Facility: CLINIC | Age: 38
End: 2023-06-23
Payer: MEDICARE

## 2023-06-23 NOTE — TELEPHONE ENCOUNTER
Notified PT of lab results and xray results, Pt verbalized understanding, she stated she is coming in Monday for lab work Dr. Reynolds requested. Pt wanted to come in to get referral for an MRI, PT already has a F/U appointment schedule as well as an appointment with Orthopedics. Stated she would like one taken elsewhere, I explained she could speak to DR Reynolds about this at her next appointment.

## 2023-06-28 ENCOUNTER — LAB VISIT (OUTPATIENT)
Dept: LAB | Facility: CLINIC | Age: 38
End: 2023-06-28
Payer: MEDICARE

## 2023-06-28 DIAGNOSIS — R74.8 ELEVATED LIVER ENZYMES: Primary | ICD-10-CM

## 2023-06-28 DIAGNOSIS — R74.8 ELEVATED LIVER ENZYMES: ICD-10-CM

## 2023-06-28 PROCEDURE — 84080 ALKALINE PHOSPHATASE, ISOENZYMES: ICD-10-PCS | Mod: 90,,, | Performed by: CLINICAL MEDICAL LABORATORY

## 2023-06-28 PROCEDURE — 84080 ASSAY ALKALINE PHOSPHATASES: CPT | Mod: 90,,, | Performed by: CLINICAL MEDICAL LABORATORY

## 2023-06-28 PROCEDURE — 84075 ASSAY ALKALINE PHOSPHATASE: CPT | Mod: 90,,, | Performed by: CLINICAL MEDICAL LABORATORY

## 2023-06-28 PROCEDURE — 84075 ALKALINE PHOSPHATASE, ISOENZYMES: ICD-10-PCS | Mod: 90,,, | Performed by: CLINICAL MEDICAL LABORATORY

## 2023-07-03 LAB
ALP BONE CFR SERPL: 33.9 % (ref 19.1–67.7)
ALP BONE SERPL-CCNC: 29.5 IU/L (ref 12.1–42.7)
ALP INTEST CFR SERPL: 1.5 % (ref 0–20.6)
ALP INTEST SERPL-CCNC: 1.3 IU/L (ref 0–11)
ALP LIVER 1 CFR SERPL: 60.4 % (ref 27.8–76.3)
ALP LIVER 1 SERPL-CCNC: 52.5 IU/L (ref 16.2–70.2)
ALP LIVER 2 CFR SERPL: 4.2 % (ref 0–8)
ALP LIVER 2 SERPL-CCNC: 3.7 IU/L (ref 0–5.8)
ALP PLAC SERPL QL: NORMAL
ALP SERPL-CCNC: 87 U/L (ref 35–104)

## 2023-07-05 ENCOUNTER — TELEPHONE (OUTPATIENT)
Dept: FAMILY MEDICINE | Facility: CLINIC | Age: 38
End: 2023-07-05
Payer: MEDICARE

## 2023-07-06 ENCOUNTER — HOSPITAL ENCOUNTER (EMERGENCY)
Facility: HOSPITAL | Age: 38
Discharge: HOME | End: 2023-07-06
Attending: FAMILY MEDICINE
Payer: MEDICARE

## 2023-07-06 VITALS
WEIGHT: 107 LBS | DIASTOLIC BLOOD PRESSURE: 84 MMHG | HEART RATE: 97 BPM | TEMPERATURE: 99 F | BODY MASS INDEX: 18.96 KG/M2 | HEIGHT: 63 IN | RESPIRATION RATE: 19 BRPM | OXYGEN SATURATION: 97 % | SYSTOLIC BLOOD PRESSURE: 122 MMHG

## 2023-07-06 DIAGNOSIS — J02.9 SORE THROAT: ICD-10-CM

## 2023-07-06 DIAGNOSIS — J38.3 VOCAL CORD STRAIN: Primary | ICD-10-CM

## 2023-07-06 PROCEDURE — 99284 PR EMERGENCY DEPT VISIT,LEVEL IV: ICD-10-PCS | Mod: ,,, | Performed by: FAMILY MEDICINE

## 2023-07-06 PROCEDURE — 99284 EMERGENCY DEPT VISIT MOD MDM: CPT

## 2023-07-06 PROCEDURE — 99284 EMERGENCY DEPT VISIT MOD MDM: CPT | Mod: ,,, | Performed by: FAMILY MEDICINE

## 2023-07-06 PROCEDURE — 63600175 PHARM REV CODE 636 W HCPCS: Performed by: FAMILY MEDICINE

## 2023-07-06 PROCEDURE — 96372 THER/PROPH/DIAG INJ SC/IM: CPT | Performed by: FAMILY MEDICINE

## 2023-07-06 RX ORDER — METHYLPREDNISOLONE ACETATE 80 MG/ML
40 INJECTION, SUSPENSION INTRA-ARTICULAR; INTRALESIONAL; INTRAMUSCULAR; SOFT TISSUE
Status: COMPLETED | OUTPATIENT
Start: 2023-07-06 | End: 2023-07-06

## 2023-07-06 RX ORDER — DEXAMETHASONE SODIUM PHOSPHATE 4 MG/ML
4 INJECTION, SOLUTION INTRA-ARTICULAR; INTRALESIONAL; INTRAMUSCULAR; INTRAVENOUS; SOFT TISSUE
Status: COMPLETED | OUTPATIENT
Start: 2023-07-06 | End: 2023-07-06

## 2023-07-06 RX ADMIN — METHYLPREDNISOLONE ACETATE 40 MG: 80 INJECTION, SUSPENSION INTRA-ARTICULAR; INTRALESIONAL; INTRAMUSCULAR; SOFT TISSUE at 07:07

## 2023-07-06 RX ADMIN — DEXAMETHASONE SODIUM PHOSPHATE 4 MG: 4 INJECTION, SOLUTION INTRA-ARTICULAR; INTRALESIONAL; INTRAMUSCULAR; INTRAVENOUS; SOFT TISSUE at 07:07
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
## 2023-07-10 ENCOUNTER — OFFICE VISIT (OUTPATIENT)
Dept: OTOLARYNGOLOGY | Facility: CLINIC | Age: 38
End: 2023-07-10
Payer: MEDICARE

## 2023-07-10 VITALS — BODY MASS INDEX: 18.96 KG/M2 | HEIGHT: 63 IN | WEIGHT: 107 LBS

## 2023-07-10 DIAGNOSIS — H90.3 SENSORINEURAL HEARING LOSS (SNHL) OF BOTH EARS: ICD-10-CM

## 2023-07-10 DIAGNOSIS — J31.0 CHRONIC RHINITIS: Primary | ICD-10-CM

## 2023-07-10 DIAGNOSIS — H93.19 TINNITUS, UNSPECIFIED LATERALITY: ICD-10-CM

## 2023-07-10 DIAGNOSIS — J38.3 VOCAL CORD STRAIN: ICD-10-CM

## 2023-07-10 DIAGNOSIS — K21.9 GASTROESOPHAGEAL REFLUX DISEASE, UNSPECIFIED WHETHER ESOPHAGITIS PRESENT: ICD-10-CM

## 2023-07-10 PROCEDURE — 31575 DIAGNOSTIC LARYNGOSCOPY: CPT | Mod: S$PBB,,, | Performed by: OTOLARYNGOLOGY

## 2023-07-10 PROCEDURE — 31575 PR LARYNGOSCOPY, FLEXIBLE; DIAGNOSTIC: ICD-10-PCS | Mod: S$PBB,,, | Performed by: OTOLARYNGOLOGY

## 2023-07-10 PROCEDURE — 31575 DIAGNOSTIC LARYNGOSCOPY: CPT | Mod: PBBFAC | Performed by: OTOLARYNGOLOGY

## 2023-07-10 PROCEDURE — 99205 PR OFFICE/OUTPT VISIT, NEW, LEVL V, 60-74 MIN: ICD-10-PCS | Mod: 25,S$PBB,, | Performed by: OTOLARYNGOLOGY

## 2023-07-10 PROCEDURE — 99214 OFFICE O/P EST MOD 30 MIN: CPT | Mod: PBBFAC,25 | Performed by: OTOLARYNGOLOGY

## 2023-07-10 PROCEDURE — 99205 OFFICE O/P NEW HI 60 MIN: CPT | Mod: 25,S$PBB,, | Performed by: OTOLARYNGOLOGY

## 2023-07-10 RX ORDER — OMEPRAZOLE 20 MG/1
20 CAPSULE, DELAYED RELEASE ORAL DAILY
Qty: 30 CAPSULE | Refills: 3 | Status: SHIPPED | OUTPATIENT
Start: 2023-07-10 | End: 2023-11-07

## 2023-07-10 NOTE — PROGRESS NOTES
Subjective:       Patient ID: Branden Hernandez is a 37 y.o. female.    Chief Complaint: Sore Throat (Vocal cord strain. Pt states she screamed x 2 hours and voice has sounded raspy since, has difficulty swallowing. ), Hearing Loss (Pt states she has decreased hearing in both ears. ), and Other (Eyes swollen, pt states she has an allergy to something, eyes have been swollen for several weeks. )    Sore Throat   Associated symptoms include trouble swallowing.     Review of Systems   HENT:  Positive for facial swelling, hearing loss, sore throat, trouble swallowing and voice change.    All other systems reviewed and are negative.    Objective:      Physical Exam  General: NAD Hoarse   Head: Normocephalic, atraumatic, no facial asymmetry/normal strength,  Ears: Both auricules normal in appearance, w/o deformities tympanic membranes normal external auditory canals normal  Nose: External nose w/o deformities congested  turbinates no drainage or inflammation  Oral Cavity: Lips, gums, floor of mouth, tongue hard palate, and buccal mucosa without mass/lesion  Oropharynx: Mucosa pink and moist, soft palate, posterior pharynx and oropharyngeal wall without mass/lesion  Neck: Supple, symmetric, trachea midline, no palpable mass/lesion, no palpable cervical lymphadenopathy  Skin: Warm and dry, no concerning lesions  Respiratory: Respirations even, unlabored     Nasal/Nasopharyngo/Laryn/Hypopharyngoscopy Procedures   Procedure: Flexible laryngoscopy  In order to fully examine the upper aerodigestive tract, including the larynx, in a patient with a hyperactive gag reflex, flexible endoscopy is required.  After explaining the procedure and obtaining verbal consent, a timeout was performed with the patient's participation according to the universal protocol. Both nasal cavities were anesthetized with 4% Xylocaine spray mixed with Casimiro-Synephrine. The flexible laryngoscope was inserted into the nasal cavity and advanced to visualize  the nasal cavity, nasopharynx, the posterior oropharynx, hypopharynx, and the endolarynx with the above findings noted. The scope was removed and the procedure terminated. The patient tolerated this procedure well without apparent complication.      Procedure:  Diagnostic nasal, nasopharyngoscopy, laryngoscopy and hypopharyngoscopy.    Routine preparation with local atomizer with 1% neosynephrine and lidocaine . With customary flexible endoscope.     NOSE:   External:  No gross deformity   Intranasal:    Mucosa:  No polyps, ulcers or lesions.    Septum:  No gross deformity.    Turbinates:  Not enlarged.    Nasopharynx:  No lesions.   Mucosa:  No lesions.   Posterior Choanae:  Patent.   Eustachian Tubes:  Patent.  Larynx/hypopharynx:   Epiglottis:  No lesions, without edema.   AE Folds:  No lesions.   Vocal cords:  Irritated inflamed left ; nl mobility   Subglottis: No obvious stenosis   Hypopharynx:  No lesions.   Piriform sinus:  No pooling or lesions.   Post Cricoid:  + edema+erythema   Assessment:       1. Chronic rhinitis    2. Vocal cord strain    3. Gastroesophageal reflux disease, unspecified whether esophagitis present    4. Sensorineural hearing loss (SNHL) of both ears    5. Tinnitus, unspecified laterality        Plan:       Prilosec voice rest hydration   RAST for allergies   F/u 1 month

## 2023-07-12 ENCOUNTER — HOSPITAL ENCOUNTER (EMERGENCY)
Facility: HOSPITAL | Age: 38
Discharge: LEFT BEFORE DISCHARGE | End: 2023-07-12
Payer: MEDICARE

## 2023-07-12 VITALS
HEART RATE: 103 BPM | SYSTOLIC BLOOD PRESSURE: 115 MMHG | WEIGHT: 114 LBS | BODY MASS INDEX: 20.2 KG/M2 | OXYGEN SATURATION: 99 % | DIASTOLIC BLOOD PRESSURE: 68 MMHG | HEIGHT: 63 IN | RESPIRATION RATE: 20 BRPM | TEMPERATURE: 99 F

## 2023-07-12 DIAGNOSIS — J02.9 SORE THROAT: Primary | ICD-10-CM

## 2023-07-12 LAB
ANION GAP SERPL CALCULATED.3IONS-SCNC: 9 MMOL/L (ref 7–16)
BASOPHILS # BLD AUTO: 0.04 K/UL (ref 0–0.2)
BASOPHILS NFR BLD AUTO: 0.3 % (ref 0–1)
BUN SERPL-MCNC: 5 MG/DL (ref 7–18)
BUN/CREAT SERPL: 8 (ref 6–20)
CALCIUM SERPL-MCNC: 9.4 MG/DL (ref 8.5–10.1)
CHLORIDE SERPL-SCNC: 110 MMOL/L (ref 98–107)
CO2 SERPL-SCNC: 27 MMOL/L (ref 21–32)
CREAT SERPL-MCNC: 0.66 MG/DL (ref 0.55–1.02)
DIFFERENTIAL METHOD BLD: ABNORMAL
EGFR (NO RACE VARIABLE) (RUSH/TITUS): 115 ML/MIN/1.73M2
EOSINOPHIL # BLD AUTO: 0.04 K/UL (ref 0–0.5)
EOSINOPHIL NFR BLD AUTO: 0.3 % (ref 1–4)
ERYTHROCYTE [DISTWIDTH] IN BLOOD BY AUTOMATED COUNT: 12.9 % (ref 11.5–14.5)
GLUCOSE SERPL-MCNC: 90 MG/DL (ref 74–106)
HCT VFR BLD AUTO: 38.1 % (ref 38–47)
HGB BLD-MCNC: 12.8 G/DL (ref 12–16)
IMM GRANULOCYTES # BLD AUTO: 0.05 K/UL (ref 0–0.04)
IMM GRANULOCYTES NFR BLD: 0.4 % (ref 0–0.4)
LYMPHOCYTES # BLD AUTO: 2.3 K/UL (ref 1–4.8)
LYMPHOCYTES NFR BLD AUTO: 16.2 % (ref 27–41)
MCH RBC QN AUTO: 35.2 PG (ref 27–31)
MCHC RBC AUTO-ENTMCNC: 33.6 G/DL (ref 32–36)
MCV RBC AUTO: 104.7 FL (ref 80–96)
MONOCYTES # BLD AUTO: 0.8 K/UL (ref 0–0.8)
MONOCYTES NFR BLD AUTO: 5.6 % (ref 2–6)
MPC BLD CALC-MCNC: 9.3 FL (ref 9.4–12.4)
NEUTROPHILS # BLD AUTO: 11.01 K/UL (ref 1.8–7.7)
NEUTROPHILS NFR BLD AUTO: 77.2 % (ref 53–65)
NRBC # BLD AUTO: 0 X10E3/UL
NRBC, AUTO (.00): 0 %
PLATELET # BLD AUTO: 255 K/UL (ref 150–400)
POTASSIUM SERPL-SCNC: 3.2 MMOL/L (ref 3.5–5.1)
RBC # BLD AUTO: 3.64 M/UL (ref 4.2–5.4)
SODIUM SERPL-SCNC: 143 MMOL/L (ref 136–145)
WBC # BLD AUTO: 14.24 K/UL (ref 4.5–11)

## 2023-07-12 PROCEDURE — 80048 BASIC METABOLIC PNL TOTAL CA: CPT | Performed by: NURSE PRACTITIONER

## 2023-07-12 PROCEDURE — 96375 TX/PRO/DX INJ NEW DRUG ADDON: CPT

## 2023-07-12 PROCEDURE — 25000003 PHARM REV CODE 250: Performed by: NURSE PRACTITIONER

## 2023-07-12 PROCEDURE — 96374 THER/PROPH/DIAG INJ IV PUSH: CPT

## 2023-07-12 PROCEDURE — 85025 COMPLETE CBC W/AUTO DIFF WBC: CPT | Performed by: NURSE PRACTITIONER

## 2023-07-12 PROCEDURE — 99284 EMERGENCY DEPT VISIT MOD MDM: CPT | Mod: 25

## 2023-07-12 PROCEDURE — 96361 HYDRATE IV INFUSION ADD-ON: CPT

## 2023-07-12 PROCEDURE — 63600175 PHARM REV CODE 636 W HCPCS: Performed by: NURSE PRACTITIONER

## 2023-07-12 PROCEDURE — 99284 PR EMERGENCY DEPT VISIT,LEVEL IV: ICD-10-PCS | Mod: ,,, | Performed by: NURSE PRACTITIONER

## 2023-07-12 PROCEDURE — 99284 EMERGENCY DEPT VISIT MOD MDM: CPT | Mod: ,,, | Performed by: NURSE PRACTITIONER

## 2023-07-12 RX ORDER — KETOROLAC TROMETHAMINE 30 MG/ML
30 INJECTION, SOLUTION INTRAMUSCULAR; INTRAVENOUS
Status: COMPLETED | OUTPATIENT
Start: 2023-07-12 | End: 2023-07-12

## 2023-07-12 RX ORDER — DEXAMETHASONE SODIUM PHOSPHATE 4 MG/ML
8 INJECTION, SOLUTION INTRA-ARTICULAR; INTRALESIONAL; INTRAMUSCULAR; INTRAVENOUS; SOFT TISSUE
Status: COMPLETED | OUTPATIENT
Start: 2023-07-12 | End: 2023-07-12

## 2023-07-12 RX ORDER — SODIUM CHLORIDE 9 MG/ML
1000 INJECTION, SOLUTION INTRAVENOUS
Status: COMPLETED | OUTPATIENT
Start: 2023-07-12 | End: 2023-07-12

## 2023-07-12 RX ADMIN — DEXAMETHASONE SODIUM PHOSPHATE 8 MG: 4 INJECTION, SOLUTION INTRA-ARTICULAR; INTRALESIONAL; INTRAMUSCULAR; INTRAVENOUS; SOFT TISSUE at 03:07

## 2023-07-12 RX ADMIN — SODIUM CHLORIDE 1000 ML: 9 INJECTION, SOLUTION INTRAVENOUS at 03:07

## 2023-07-12 RX ADMIN — KETOROLAC TROMETHAMINE 30 MG: 30 INJECTION, SOLUTION INTRAMUSCULAR; INTRAVENOUS at 03:07

## 2023-07-12 NOTE — ED PROVIDER NOTES
Encounter Date: 7/12/2023       History     Chief Complaint   Patient presents with    Sore Throat     Pt c/o sore throat, states she hasn't been able to eat or drink and has lost weight. Pt states she is dehydrated. States she needs an IV because she walked 2 [place] [address] to get seen at this ER.     37 year old female presents to ED with complaint of sore throat and dehydration. Patient states she was seen on 7/8 for sore throat due to screaming for approximately 2 hours straight and was diagnosed with vocal cord strain. Patient had follow up with ENT on 7/10. Patient states symptoms have not gotten better and she continues to have sore throat, difficulty eating/drinking, and difficulty with breathing. She states she stopped talking and did vocal rest as instructed. Denies fever, chills, chest pain, shortness of breath, weakness, dizziness. Reports being dehydrated due to not being able to eat and walking long distance to come to ED for further evaluation.     The history is provided by the patient and medical records.   Review of patient's allergies indicates:   Allergen Reactions    Arava [leflunomide]     Enbrel [etanercept]     Latex Other (See Comments)    Penicillins     Levofloxacin Rash     Past Medical History:   Diagnosis Date    Allergic rhinitis 11/30/2022    Cardiomyopathy 11/22/2022    Insomnia 8/31/2022    Intracranial aneurysm 2 mm evaluated by Dr. Badillo 2021 5/12/2021    Formatting of this note might be different from the original. Very small, left cavernous, 2 mm + Family history, aunt on dad's side    MDD (major depressive disorder) 8/31/2022    Migraine 11/22/2022    Osteoarthritis 1/26/2022    PTSD (post-traumatic stress disorder) 8/31/2022    Restless legs 3/1/2022    Sleep apnea 4/6/2021     Past Surgical History:   Procedure Laterality Date    HYSTERECTOMY      LAPAROTOMY       Family History   Problem Relation Age of Onset    Diabetes Mother     Bipolar disorder Mother      Hypertension Mother     Bipolar disorder Father     Bipolar disorder Sister     Diabetes Sister     Hypertension Sister     Seizures Brother     Bipolar disorder Brother     Hypertension Brother     Stroke Maternal Grandmother     Cancer Maternal Grandmother      Social History     Tobacco Use    Smoking status: Every Day     Packs/day: 1.50     Years: 20.00     Pack years: 30.00     Types: Cigarettes     Start date: 2002    Smokeless tobacco: Never    Tobacco comments:     5 cigs a day   Substance Use Topics    Alcohol use: Never    Drug use: Never     Review of Systems   Constitutional:  Negative for chills and fever.   HENT:  Positive for sore throat and trouble swallowing. Negative for congestion.    Eyes:  Negative for photophobia and visual disturbance.   Respiratory:  Negative for cough, shortness of breath and stridor.    Cardiovascular:  Negative for chest pain and palpitations.   Gastrointestinal:  Negative for nausea and vomiting.   Genitourinary:  Negative for enuresis and urgency.   Musculoskeletal:  Negative for arthralgias and gait problem.   Skin:  Negative for color change and wound.   Allergic/Immunologic: Negative for environmental allergies and immunocompromised state.   Neurological:  Negative for dizziness and weakness.   Hematological:  Negative for adenopathy. Does not bruise/bleed easily.   Psychiatric/Behavioral:  Negative for agitation and confusion.      Physical Exam     Initial Vitals [07/12/23 1440]   BP Pulse Resp Temp SpO2   115/68 103 20 98.8 °F (37.1 °C) 99 %      MAP       --         Physical Exam    Nursing note and vitals reviewed.  Constitutional: She appears well-developed and well-nourished.   HENT:   Head: Normocephalic and atraumatic.   Mouth/Throat: Posterior oropharyngeal erythema present. No posterior oropharyngeal edema.   Eyes: EOM are normal. Pupils are equal, round, and reactive to light.   Neck:   Normal range of motion.  Cardiovascular:  Normal rate and regular  rhythm.           No murmur heard.  Pulmonary/Chest: She has no wheezes. She has no rhonchi.   Abdominal: Abdomen is soft. She exhibits no distension. There is no abdominal tenderness.   Musculoskeletal:         General: No tenderness or edema.      Cervical back: Normal range of motion.     Lymphadenopathy:     She has no cervical adenopathy.   Neurological: She is alert and oriented to person, place, and time. No cranial nerve deficit or sensory deficit.   Skin: Skin is warm and dry. Capillary refill takes less than 2 seconds.   Psychiatric: She has a normal mood and affect. Thought content normal.       Medical Screening Exam   See Full Note    ED Course   Procedures  Labs Reviewed   BASIC METABOLIC PANEL - Abnormal; Notable for the following components:       Result Value    Potassium 3.2 (*)     Chloride 110 (*)     BUN 5 (*)     All other components within normal limits   CBC WITH DIFFERENTIAL - Abnormal; Notable for the following components:    WBC 14.24 (*)     RBC 3.64 (*)     .7 (*)     MCH 35.2 (*)     MPV 9.3 (*)     Neutrophils % 77.2 (*)     Lymphocytes % 16.2 (*)     Eosinophils % 0.3 (*)     Neutrophils, Abs 11.01 (*)     Immature Granulocytes, Absolute 0.05 (*)     All other components within normal limits   CBC W/ AUTO DIFFERENTIAL    Narrative:     The following orders were created for panel order CBC auto differential.  Procedure                               Abnormality         Status                     ---------                               -----------         ------                     CBC with Differential[[id number]]        Abnormal            Final result                 Please view results for these tests on the individual orders.   EXTRA TUBES    Narrative:     The following orders were created for panel order EXTRA TUBES.  Procedure                               Abnormality         Status                     ---------                               -----------         ------                      Light Blue Top Hold[[id number]]                              In process                 Red Top Hold[[id number]]                                     In process                   Please view results for these tests on the individual orders.   LIGHT BLUE TOP HOLD   RED TOP HOLD          Imaging Results    None          Medications   dexAMETHasone injection 8 mg (8 mg Intravenous Given 7/12/23 1507)   0.9%  NaCl infusion (0 mLs Intravenous Stopped 7/12/23 1600)   ketorolac injection 30 mg (30 mg Intravenous Given 7/12/23 1508)     Medical Decision Making:   Initial Assessment:   Sore throat  dehydration  Differential Diagnosis:   Vocal cord strain  pharyngitis  ED Management:  [facility]    Patient presents for emergent evaluation of acute sore throat, dehydration that poses a threat to life and/or bodily function.    In the ED patient found to have acute sore throat, dehydration.    I ordered labs and personally reviewed them.  Labs significant for WBC 14.24, potassium 3.2, chloride 110; .7    Discharge MDM  Patient was managed in the ED with IV NS, Decadron, Toradol.    The response to treatment was good.    Patient was discharged in stable condition.  Detailed return precautions discussed.       APC / Resident Notes:   Left prior to completion of therapies/further workup. Received medications and fluids.                   Clinical Impression:   Final diagnoses:  [J02.9] Sore throat (Primary)        ED Disposition Condition    AMA Stable                Torrie Cole, CHANO  07/12/23 5750

## 2023-07-12 NOTE — ED NOTES
Pt called out and upon RN entering her room, she demanded her IV be taken out and that she was ready to leave;  pt encouraged to stay and finish treatment but she refused;  CHANO Rios made aware    Pt refused to sign AMA form

## 2023-07-12 NOTE — Clinical Note
"Date: 7/12/2023  Patient: Branden Hernandez  Admitted: 7/12/2023  2:39 PM  Attending Provider: No att. providers found    Branden Hernandez or her authorized caregiver has made the decision for the patient to leave the emergency department against  the advice of ER staff. She or her authorized caregiver has been informed and understands the inherent risks, including death.  She or her authorized caregiver has decided to accept the responsibility for this decision. Branden Hernandez and all [facility] essary parties have been advised that she may return for further evaluation or treatment. Her condition at time of discharge was stable.  Branden Hernandez had current vital signs as follows:  /68   Pulse 103   Temp 98.8 °F (37.1 °C) (Oral)    Resp 20   Ht 5' 3" (1.6 m)   Wt 51.7 kg (114 lb)   " no

## 2023-07-13 ENCOUNTER — TELEPHONE (OUTPATIENT)
Dept: EMERGENCY MEDICINE | Facility: HOSPITAL | Age: 38
End: 2023-07-13
Payer: MEDICARE

## 2023-07-17 ENCOUNTER — HOSPITAL ENCOUNTER (EMERGENCY)
Facility: HOSPITAL | Age: 38
Discharge: LEFT BEFORE DISCHARGE | End: 2023-07-18
Attending: EMERGENCY MEDICINE
Payer: MEDICARE

## 2023-07-17 VITALS
RESPIRATION RATE: 18 BRPM | WEIGHT: 114 LBS | DIASTOLIC BLOOD PRESSURE: 59 MMHG | HEIGHT: 63 IN | OXYGEN SATURATION: 95 % | HEART RATE: 102 BPM | TEMPERATURE: 98 F | BODY MASS INDEX: 20.2 KG/M2 | SYSTOLIC BLOOD PRESSURE: 109 MMHG

## 2023-07-17 DIAGNOSIS — R52 BODY ACHES: ICD-10-CM

## 2023-07-17 DIAGNOSIS — E87.6 HYPOKALEMIA: Primary | ICD-10-CM

## 2023-07-17 PROCEDURE — 99284 EMERGENCY DEPT VISIT MOD MDM: CPT | Mod: ,,, | Performed by: EMERGENCY MEDICINE

## 2023-07-17 PROCEDURE — 99284 PR EMERGENCY DEPT VISIT,LEVEL IV: ICD-10-PCS | Mod: ,,, | Performed by: EMERGENCY MEDICINE

## 2023-07-17 PROCEDURE — 99284 EMERGENCY DEPT VISIT MOD MDM: CPT | Mod: 25

## 2023-07-17 NOTE — LETTER
Patient: Fiona Hernandez  YOB: 1985  Date: 7/18/2023 Time: 1:49 AM  Location: [facility]  Emergency Department    Leaving the Hospital Against Medical Advice    Chart #:[id number]    This will certify that I, the undersigned,    ______________________________________________________________________    A patient in the above named medical center, having requested discharge and removal from the medical [facility] against the advice of my attending physician(s), hereby release [facility], its physicians, officers and employees, severally and individually, from any and all liability of any nature whatsoever for any injury or harm or complication of any kind that may result directly or indirectly, by reason of my terminating my stay as a patient at [facility] and my departure from [facility], and hereby waive any and all rights of action I may now have or later acquire as a result of my voluntary departure from [facility] and the termination of my stay as a patient therein.    This release is made with the full knowledge of the danger that may result from the action which I am taking.      Date:_______________________                         ___________________________                                                                                    Patient/Legal Representative    Witness:        ____________________________                          ___________________________  Nurse                                                                        Physician

## 2023-07-18 ENCOUNTER — TELEPHONE (OUTPATIENT)
Dept: EMERGENCY MEDICINE | Facility: HOSPITAL | Age: 38
End: 2023-07-18
Payer: MEDICARE

## 2023-07-18 DIAGNOSIS — R13.10 DYSPHAGIA, UNSPECIFIED TYPE: Primary | ICD-10-CM

## 2023-07-18 LAB
ALBUMIN SERPL BCP-MCNC: 3.5 G/DL (ref 3.5–5)
ALBUMIN/GLOB SERPL: 1.1 {RATIO}
ALP SERPL-CCNC: 83 U/L (ref 37–98)
ALT SERPL W P-5'-P-CCNC: 18 U/L (ref 13–56)
AMPHET UR QL SCN: NEGATIVE
ANION GAP SERPL CALCULATED.3IONS-SCNC: 7 MMOL/L (ref 7–16)
AST SERPL W P-5'-P-CCNC: 9 U/L (ref 15–37)
BARBITURATES UR QL SCN: NEGATIVE
BASOPHILS # BLD AUTO: 0.06 K/UL (ref 0–0.2)
BASOPHILS NFR BLD AUTO: 0.4 % (ref 0–1)
BENZODIAZ METAB UR QL SCN: NEGATIVE
BILIRUB SERPL-MCNC: 0.2 MG/DL (ref ?–1.2)
BILIRUB UR QL STRIP: NEGATIVE
BUN SERPL-MCNC: 8 MG/DL (ref 7–18)
BUN/CREAT SERPL: 11 (ref 6–20)
CALCIUM SERPL-MCNC: 8.9 MG/DL (ref 8.5–10.1)
CANNABINOIDS UR QL SCN: POSITIVE
CHLORIDE SERPL-SCNC: 102 MMOL/L (ref 98–107)
CLARITY UR: CLEAR
CO2 SERPL-SCNC: 34 MMOL/L (ref 21–32)
COCAINE UR QL SCN: NEGATIVE
COLOR UR: NORMAL
CREAT SERPL-MCNC: 0.76 MG/DL (ref 0.55–1.02)
DIFFERENTIAL METHOD BLD: ABNORMAL
EGFR (NO RACE VARIABLE) (RUSH/TITUS): 103 ML/MIN/1.73M2
EOSINOPHIL # BLD AUTO: 0.17 K/UL (ref 0–0.5)
EOSINOPHIL NFR BLD AUTO: 1.3 % (ref 1–4)
ERYTHROCYTE [DISTWIDTH] IN BLOOD BY AUTOMATED COUNT: 12.5 % (ref 11.5–14.5)
GLOBULIN SER-MCNC: 3.3 G/DL (ref 2–4)
GLUCOSE SERPL-MCNC: 101 MG/DL (ref 74–106)
GLUCOSE UR STRIP-MCNC: NORMAL MG/DL
HCT VFR BLD AUTO: 41.4 % (ref 38–47)
HGB BLD-MCNC: 13.8 G/DL (ref 12–16)
IMM GRANULOCYTES # BLD AUTO: 0.05 K/UL (ref 0–0.04)
IMM GRANULOCYTES NFR BLD: 0.4 % (ref 0–0.4)
KETONES UR STRIP-SCNC: NEGATIVE MG/DL
LEUKOCYTE ESTERASE UR QL STRIP: NEGATIVE
LYMPHOCYTES # BLD AUTO: 2.96 K/UL (ref 1–4.8)
LYMPHOCYTES NFR BLD AUTO: 22 % (ref 27–41)
MACROCYTES BLD QL SMEAR: NORMAL
MCH RBC QN AUTO: 35.4 PG (ref 27–31)
MCHC RBC AUTO-ENTMCNC: 33.3 G/DL (ref 32–36)
MCV RBC AUTO: 106.2 FL (ref 80–96)
MONOCYTES # BLD AUTO: 0.81 K/UL (ref 0–0.8)
MONOCYTES NFR BLD AUTO: 6 % (ref 2–6)
MPC BLD CALC-MCNC: 9.3 FL (ref 9.4–12.4)
NEUTROPHILS # BLD AUTO: 9.38 K/UL (ref 1.8–7.7)
NEUTROPHILS NFR BLD AUTO: 69.9 % (ref 53–65)
NITRITE UR QL STRIP: NEGATIVE
NRBC # BLD AUTO: 0 X10E3/UL
NRBC, AUTO (.00): 0 %
OPIATES UR QL SCN: NEGATIVE
PCP UR QL SCN: NEGATIVE
PH UR STRIP: 6.5 PH UNITS
PLATELET # BLD AUTO: 243 K/UL (ref 150–400)
PLATELET MORPHOLOGY: NORMAL
POTASSIUM SERPL-SCNC: 3.3 MMOL/L (ref 3.5–5.1)
PROT SERPL-MCNC: 6.8 G/DL (ref 6.4–8.2)
PROT UR QL STRIP: NEGATIVE
RBC # BLD AUTO: 3.9 M/UL (ref 4.2–5.4)
RBC # UR STRIP: NEGATIVE /UL
SODIUM SERPL-SCNC: 140 MMOL/L (ref 136–145)
SP GR UR STRIP: 1.02
UROBILINOGEN UR STRIP-ACNC: NORMAL MG/DL
WBC # BLD AUTO: 13.43 K/UL (ref 4.5–11)

## 2023-07-18 PROCEDURE — 63600175 PHARM REV CODE 636 W HCPCS: Performed by: EMERGENCY MEDICINE

## 2023-07-18 PROCEDURE — 25000003 PHARM REV CODE 250: Performed by: EMERGENCY MEDICINE

## 2023-07-18 PROCEDURE — 81003 URINALYSIS AUTO W/O SCOPE: CPT | Performed by: EMERGENCY MEDICINE

## 2023-07-18 PROCEDURE — 80307 DRUG TEST PRSMV CHEM ANLYZR: CPT | Performed by: EMERGENCY MEDICINE

## 2023-07-18 PROCEDURE — 96360 HYDRATION IV INFUSION INIT: CPT

## 2023-07-18 PROCEDURE — 80053 COMPREHEN METABOLIC PANEL: CPT | Performed by: EMERGENCY MEDICINE

## 2023-07-18 PROCEDURE — 85025 COMPLETE CBC W/AUTO DIFF WBC: CPT | Performed by: EMERGENCY MEDICINE

## 2023-07-18 RX ORDER — POTASSIUM CHLORIDE 20 MEQ/1
40 TABLET, EXTENDED RELEASE ORAL
Status: COMPLETED | OUTPATIENT
Start: 2023-07-18 | End: 2023-07-18

## 2023-07-18 RX ADMIN — SODIUM CHLORIDE, POTASSIUM CHLORIDE, SODIUM LACTATE AND CALCIUM CHLORIDE 1000 ML: 600; 310; 30; 20 INJECTION, SOLUTION INTRAVENOUS at 12:07

## 2023-07-18 RX ADMIN — POTASSIUM CHLORIDE 40 MEQ: 1500 TABLET, EXTENDED RELEASE ORAL at 01:07
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
## 2023-07-18 NOTE — ED TRIAGE NOTES
Pt presents cc of pain all of over, she has had lots of anxiety lately, and has recently gotten a call back about blood work and needs help having those read.

## 2023-07-19 ENCOUNTER — TELEPHONE (OUTPATIENT)
Dept: OTOLARYNGOLOGY | Facility: CLINIC | Age: 38
End: 2023-07-19
Payer: MEDICARE

## 2023-07-19 DIAGNOSIS — J02.9 SORE THROAT: Primary | ICD-10-CM

## 2023-07-19 RX ORDER — NYSTATIN 100000 [USP'U]/ML
5 SUSPENSION ORAL 4 TIMES DAILY
Qty: 200 ML | Refills: 0 | Status: SHIPPED | OUTPATIENT
Start: 2023-07-19 | End: 2023-07-29

## 2023-07-24 ENCOUNTER — HOSPITAL ENCOUNTER (EMERGENCY)
Facility: HOSPITAL | Age: 38
Discharge: LEFT BEFORE DISCHARGE | End: 2023-07-24
Attending: EMERGENCY MEDICINE
Payer: MEDICARE

## 2023-07-24 DIAGNOSIS — T14.90XA TRAUMA: ICD-10-CM

## 2023-07-24 PROCEDURE — 99283 PR EMERGENCY DEPT VISIT,LEVEL III: ICD-10-PCS | Mod: ,,, | Performed by: EMERGENCY MEDICINE

## 2023-07-24 PROCEDURE — 99283 EMERGENCY DEPT VISIT LOW MDM: CPT | Mod: ,,, | Performed by: EMERGENCY MEDICINE

## 2023-07-24 PROCEDURE — 99283 EMERGENCY DEPT VISIT LOW MDM: CPT

## 2023-07-24 RX ORDER — HYDROCODONE BITARTRATE AND ACETAMINOPHEN 10; 325 MG/1; MG/1
1 TABLET ORAL
Status: DISCONTINUED | OUTPATIENT
Start: 2023-07-24 | End: 2023-07-24 | Stop reason: HOSPADM

## 2023-07-24 NOTE — LETTER
Patient: Fiona Hernandez  YOB: 1985  Date: 7/24/2023 Time: 1:40 PM  Location: [facility]  Emergency Department    Leaving the Hospital Against Medical Advice    Chart #:[id number]    This will certify that I, the undersigned,    ______________________________________________________________________    A patient in the above named medical center, having requested discharge and removal from the medical [place] against the advice of my attending physician(s), hereby release [facility], its physicians, officers and employees, severally and individually, from any and all liability of any nature whatsoever for any injury or harm or complication of any kind that may result directly or indirectly, by reason of my terminating my stay as a patient at [facility] and my departure from [facility], and hereby waive any and all rights of action I may now have or later acquire as a result of my voluntary departure from [facility] and the termination of my stay as a patient therein.    This release is made with the full knowledge of the danger that may result from the action which I am taking.      Date:_______________________                         ___________________________                                                                                    Patient/Legal Representative    Witness:        ____________________________                          ___________________________  Nurse                                                                        Physician
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
## 2023-07-27 ENCOUNTER — HOSPITAL ENCOUNTER (EMERGENCY)
Facility: HOSPITAL | Age: 38
Discharge: HOME | End: 2023-07-27
Attending: EMERGENCY MEDICINE
Payer: MEDICARE

## 2023-07-27 VITALS
RESPIRATION RATE: 18 BRPM | HEIGHT: 63 IN | SYSTOLIC BLOOD PRESSURE: 120 MMHG | WEIGHT: 101.5 LBS | HEART RATE: 88 BPM | TEMPERATURE: 99 F | BODY MASS INDEX: 17.98 KG/M2 | DIASTOLIC BLOOD PRESSURE: 82 MMHG | OXYGEN SATURATION: 98 %

## 2023-07-27 DIAGNOSIS — T14.90XA INJURY: ICD-10-CM

## 2023-07-27 DIAGNOSIS — S42.034A CLOSED NONDISPLACED FRACTURE OF ACROMIAL END OF RIGHT CLAVICLE, INITIAL ENCOUNTER: Primary | ICD-10-CM

## 2023-07-27 PROCEDURE — 99284 EMERGENCY DEPT VISIT MOD MDM: CPT | Mod: ,,, | Performed by: EMERGENCY MEDICINE

## 2023-07-27 PROCEDURE — 99284 PR EMERGENCY DEPT VISIT,LEVEL IV: ICD-10-PCS | Mod: ,,, | Performed by: EMERGENCY MEDICINE

## 2023-07-27 PROCEDURE — 99283 EMERGENCY DEPT VISIT LOW MDM: CPT

## 2023-07-27 RX ORDER — METHOCARBAMOL 500 MG/1
TABLET, FILM COATED ORAL 3 TIMES DAILY
Qty: 30 TABLET | Refills: 0 | Status: SHIPPED | OUTPATIENT
Start: 2023-07-27 | End: 2023-08-01

## 2023-07-27 NOTE — DISCHARGE INSTRUCTIONS
Take hot showers.  Wash wounds gently.  Use antibiotic ointment twice daily    Use arm sling as instructed    Follow-up with orthopedics    Use Robaxin as needed

## 2023-07-27 NOTE — ED PROVIDER NOTES
Encounter Date: 7/27/2023    SCRIBE #1 NOTE: I, Kathryn Masters, am scribing for, and in the presence of,  Ted Crooks MD. I have scribed the entire note.     History     Chief Complaint   Patient presents with    General Illness     Pt states she was seen in the er 7/24 - pt is back tonight for evaluation for right eye pain - pt states she walked out of the er refusing treatment and has returned with c/o right eye pain/bruisng     The patient is a 37 y.o. female that presents to the emergency department due to general illness. The patient explains that she was seen here on 7/24 and left AMA. She states that she is back here for further evaluation of her right eye pain. The patient states that she is experiencing right arm pain, right shoulder pain, neck pain, shortness of breath, and back pain. She states the only drugs she uses is medical marijuana. No other symptoms or medical hx were reported.     The history is provided by the patient. No  was used.   Review of patient's allergies indicates:   Allergen Reactions    Arava [leflunomide]     Enbrel [etanercept]     Latex Other (See Comments)    Penicillins     Levofloxacin Rash     Past Medical History:   Diagnosis Date    Allergic rhinitis 11/30/2022    Cardiomyopathy 11/22/2022    Insomnia 8/31/2022    Intracranial aneurysm 2 mm evaluated by Dr. Badillo 2021 5/12/2021    Formatting of this note might be different from the original. Very small, left cavernous, 2 mm + Family history, aunt on dad's side    MDD (major depressive disorder) 8/31/2022    Migraine 11/22/2022    Osteoarthritis 1/26/2022    PTSD (post-traumatic stress disorder) 8/31/2022    Restless legs 3/1/2022    Sleep apnea 4/6/2021     Past Surgical History:   Procedure Laterality Date    HYSTERECTOMY      LAPAROTOMY       Family History   Problem Relation Age of Onset    Diabetes Mother     Bipolar disorder Mother     Hypertension Mother     Bipolar disorder Father      Bipolar disorder Sister     Diabetes Sister     Hypertension Sister     Seizures Brother     Bipolar disorder Brother     Hypertension Brother     Stroke Maternal Grandmother     Cancer Maternal Grandmother      Social History     Tobacco Use    Smoking status: Every Day     Packs/day: 1.50     Years: 20.00     Pack years: 30.00     Types: Cigarettes     Start date: 2002    Smokeless tobacco: Never    Tobacco comments:     5 cigs a day   Substance Use Topics    Alcohol use: Never    Drug use: Never     Review of Systems   Constitutional: Negative.    HENT: Negative.     Eyes:  Positive for pain.   Respiratory:  Positive for shortness of breath.    Cardiovascular: Negative.    Gastrointestinal: Negative.    Endocrine: Negative.    Genitourinary: Negative.    Musculoskeletal:  Positive for back pain and neck pain.        Right shoulder pain   Skin: Negative.    Allergic/Immunologic: Negative.    Neurological: Negative.    Hematological: Negative.    Psychiatric/Behavioral: Negative.     All other systems reviewed and are negative.    Physical Exam     Initial Vitals [07/27/23 0131]   BP Pulse Resp Temp SpO2   120/82 88 18 98.7 °F (37.1 °C) 98 %      MAP       --         Physical Exam    Constitutional: She appears well-developed and well-nourished.   Eyes: Conjunctivae and EOM are normal. Pupils are equal, round, and reactive to light.   Neck: Neck supple.   Normal range of motion.  Cardiovascular:  Normal heart sounds.           Pulmonary/Chest: Breath sounds normal.   Abdominal: Abdomen is soft. Bowel sounds are normal.   Musculoskeletal:         General: Normal range of motion.      Cervical back: Normal range of motion and neck supple.     Neurological: She is alert and oriented to person, place, and time. She has normal strength and normal reflexes.   Skin: Skin is warm and dry.   Psychiatric: She has a normal mood and affect. Her behavior is normal. Judgment and thought content normal.       ED Course    Procedures  Labs Reviewed - No data to display       Imaging Results              X-Ray Shoulder Complete 2 View Right (In process)                      Medications - No data to display  Medical Decision Making:   ED Management:  [facility]    Patient presents for emergent evaluation of acute pain right shoulder that poses a threat to life and/or bodily function.    In the ED patient found to have acute right lateral clavicle fracture.  No bony tenderness to suspect cervical fracture.  Next dose criteria was negative.  No vertebral tenderness.  Patient did have some contusions to the extremities and some tenderness but do not suspect other fracture.  Abdomen is soft and nontender.  Lung sounds clear bilateral do not suspect pneumothorax or rib fracture  I ordered X-rays and personally reviewed them and reviewed the radiologist interpretation.  Xray significant for right lateral clavicle fracture      Discharge [facility]  Patient was managed in the ED with arm sling  The response to treatment was improved.    Patient was discharged in stable condition.  Detailed return precautions discussed.           Attending Attestation:           Physician Attestation for Scribe:  Physician Attestation Statement for Scribe #1: I, Ted Crooks MD, reviewed documentation, as scribed by Kathryn Masters in my presence, and it is both accurate and complete.                        Clinical Impression:   Final diagnoses:  [T14.90XA] Injury  [S42.034A] Closed nondisplaced fracture of acromial end of right clavicle, initial encounter (Primary)        ED Disposition Condition    Discharge Stable          ED Prescriptions       Medication Sig Dispense Start Date End Date Auth. Provider    methocarbamoL (ROBAXIN) 500 MG Tab Take 2-3 tablets (1,000-1,500 mg total) by mouth 3 (three) times daily. for 5 days 30 tablet 7/27/2023 8/1/2023 Ted Crooks MD          Follow-up Information       Follow up With Specialties Details Why Contact Info     [facility] Emergency Medicine Go to  As needed, If symptoms worsen [address] [id number] [phone]    Rodrigue Baldwin III, MD Orthopedic Surgery Schedule an appointment as soon as possible for a visit   [address] [place] [id number]  [phone]               Ted Crooks MD  07/27/23 0347

## 2023-07-31 ENCOUNTER — HOSPITAL ENCOUNTER (EMERGENCY)
Facility: HOSPITAL | Age: 38
Discharge: HOME | End: 2023-07-31
Payer: MEDICARE

## 2023-07-31 VITALS
TEMPERATURE: 100 F | OXYGEN SATURATION: 99 % | DIASTOLIC BLOOD PRESSURE: 74 MMHG | HEART RATE: 115 BPM | HEIGHT: 63 IN | WEIGHT: 103 LBS | BODY MASS INDEX: 18.25 KG/M2 | SYSTOLIC BLOOD PRESSURE: 111 MMHG | RESPIRATION RATE: 12 BRPM

## 2023-07-31 DIAGNOSIS — E86.0 DEHYDRATION: Primary | ICD-10-CM

## 2023-07-31 DIAGNOSIS — R00.0 TACHYCARDIA: ICD-10-CM

## 2023-07-31 LAB
ALBUMIN SERPL BCP-MCNC: 3.7 G/DL (ref 3.5–5)
ALBUMIN/GLOB SERPL: 1.4 {RATIO}
ALP SERPL-CCNC: 84 U/L (ref 37–98)
ALT SERPL W P-5'-P-CCNC: 20 U/L (ref 13–56)
ANION GAP SERPL CALCULATED.3IONS-SCNC: 8 MMOL/L (ref 7–16)
AST SERPL W P-5'-P-CCNC: 9 U/L (ref 15–37)
BASOPHILS # BLD AUTO: 0.06 K/UL (ref 0–0.2)
BASOPHILS NFR BLD AUTO: 0.6 % (ref 0–1)
BILIRUB SERPL-MCNC: 0.4 MG/DL (ref ?–1.2)
BUN SERPL-MCNC: 9 MG/DL (ref 7–18)
BUN/CREAT SERPL: 12 (ref 6–20)
CALCIUM SERPL-MCNC: 9.2 MG/DL (ref 8.5–10.1)
CHLORIDE SERPL-SCNC: 109 MMOL/L (ref 98–107)
CO2 SERPL-SCNC: 29 MMOL/L (ref 21–32)
CREAT SERPL-MCNC: 0.74 MG/DL (ref 0.55–1.02)
DIFFERENTIAL METHOD BLD: ABNORMAL
EGFR (NO RACE VARIABLE) (RUSH/TITUS): 106 ML/MIN/1.73M2
EOSINOPHIL # BLD AUTO: 0.09 K/UL (ref 0–0.5)
EOSINOPHIL NFR BLD AUTO: 0.9 % (ref 1–4)
ERYTHROCYTE [DISTWIDTH] IN BLOOD BY AUTOMATED COUNT: 12.8 % (ref 11.5–14.5)
GLOBULIN SER-MCNC: 2.7 G/DL (ref 2–4)
GLUCOSE SERPL-MCNC: 112 MG/DL (ref 74–106)
HCT VFR BLD AUTO: 38.3 % (ref 38–47)
HGB BLD-MCNC: 12.7 G/DL (ref 12–16)
IMM GRANULOCYTES # BLD AUTO: 0.03 K/UL (ref 0–0.04)
IMM GRANULOCYTES NFR BLD: 0.3 % (ref 0–0.4)
LYMPHOCYTES # BLD AUTO: 1.78 K/UL (ref 1–4.8)
LYMPHOCYTES NFR BLD AUTO: 17.7 % (ref 27–41)
MCH RBC QN AUTO: 34.3 PG (ref 27–31)
MCHC RBC AUTO-ENTMCNC: 33.2 G/DL (ref 32–36)
MCV RBC AUTO: 103.5 FL (ref 80–96)
MONOCYTES # BLD AUTO: 0.48 K/UL (ref 0–0.8)
MONOCYTES NFR BLD AUTO: 4.8 % (ref 2–6)
MPC BLD CALC-MCNC: 8.9 FL (ref 9.4–12.4)
NEUTROPHILS # BLD AUTO: 7.63 K/UL (ref 1.8–7.7)
NEUTROPHILS NFR BLD AUTO: 75.7 % (ref 53–65)
NRBC # BLD AUTO: 0 X10E3/UL
NRBC, AUTO (.00): 0 %
PLATELET # BLD AUTO: 274 K/UL (ref 150–400)
POTASSIUM SERPL-SCNC: 3.8 MMOL/L (ref 3.5–5.1)
PROT SERPL-MCNC: 6.4 G/DL (ref 6.4–8.2)
RBC # BLD AUTO: 3.7 M/UL (ref 4.2–5.4)
SODIUM SERPL-SCNC: 142 MMOL/L (ref 136–145)
WBC # BLD AUTO: 10.07 K/UL (ref 4.5–11)

## 2023-07-31 PROCEDURE — 99284 EMERGENCY DEPT VISIT MOD MDM: CPT | Mod: 25

## 2023-07-31 PROCEDURE — 99284 PR EMERGENCY DEPT VISIT,LEVEL IV: ICD-10-PCS | Mod: ,,,

## 2023-07-31 PROCEDURE — 96360 HYDRATION IV INFUSION INIT: CPT

## 2023-07-31 PROCEDURE — 85025 COMPLETE CBC W/AUTO DIFF WBC: CPT

## 2023-07-31 PROCEDURE — 25000003 PHARM REV CODE 250

## 2023-07-31 PROCEDURE — 93010 ELECTROCARDIOGRAM REPORT: CPT | Mod: ,,, | Performed by: INTERNAL MEDICINE

## 2023-07-31 PROCEDURE — 99284 EMERGENCY DEPT VISIT MOD MDM: CPT | Mod: ,,,

## 2023-07-31 PROCEDURE — 80053 COMPREHEN METABOLIC PANEL: CPT

## 2023-07-31 PROCEDURE — 93010 EKG 12-LEAD: ICD-10-PCS | Mod: ,,, | Performed by: INTERNAL MEDICINE

## 2023-07-31 PROCEDURE — 93005 ELECTROCARDIOGRAM TRACING: CPT

## 2023-07-31 RX ADMIN — SODIUM CHLORIDE 1000 ML: 9 INJECTION, SOLUTION INTRAVENOUS at 06:07

## 2023-07-31 NOTE — ED TRIAGE NOTES
Pt presents to ED with c/o feeling dehydrated and having weight loss, pt reports pain in right shoulder

## 2023-08-01 ENCOUNTER — PATIENT MESSAGE (OUTPATIENT)
Dept: OTOLARYNGOLOGY | Facility: CLINIC | Age: 38
End: 2023-08-01
Payer: MEDICARE

## 2023-08-01 DIAGNOSIS — J02.9 SORE THROAT: Primary | ICD-10-CM

## 2023-08-01 RX ORDER — NYSTATIN 100000 [USP'U]/ML
5 SUSPENSION ORAL 4 TIMES DAILY
Qty: 200 ML | Refills: 0 | Status: SHIPPED | OUTPATIENT
Start: 2023-08-01 | End: 2023-08-11

## 2023-08-01 NOTE — ED NOTES
Rec'vd report from JULIETA Garcia.  Pt sitting up in bed and states she felt like she was dehydrated today and that is why she came to ER.  States she has had a lot of issues lately and just felt like she needed to be seen today.  Awaiting all lab results and NS infusing to R AC with no problems noted.  [place] provided, call light in reach and pt instructed to call for any needs

## 2023-08-01 NOTE — ED PROVIDER NOTES
Encounter Date: 7/31/2023       History     Chief Complaint   Patient presents with    Dehydration     HPI  Review of patient's allergies indicates:   Allergen Reactions    Arava [leflunomide]     Enbrel [etanercept]     Ketorolac     Latex Other (See Comments)    Penicillins     Levofloxacin Rash     Past Medical History:   Diagnosis Date    Allergic rhinitis 11/30/2022    Cardiomyopathy 11/22/2022    Insomnia 8/31/2022    Intracranial aneurysm 2 mm evaluated by Dr. Badillo 2021 5/12/2021    Formatting of this note might be different from the original. Very small, left cavernous, 2 mm + Family history, aunt on dad's side    MDD (major depressive disorder) 8/31/2022    Migraine 11/22/2022    Osteoarthritis 1/26/2022    PTSD (post-traumatic stress disorder) 8/31/2022    Restless legs 3/1/2022    Sleep apnea 4/6/2021     Past Surgical History:   Procedure Laterality Date    HYSTERECTOMY      LAPAROTOMY       Family History   Problem Relation Age of Onset    Diabetes Mother     Bipolar disorder Mother     Hypertension Mother     Bipolar disorder Father     Bipolar disorder Sister     Diabetes Sister     Hypertension Sister     Seizures Brother     Bipolar disorder Brother     Hypertension Brother     Stroke Maternal Grandmother     Cancer Maternal Grandmother      Social History     Tobacco Use    Smoking status: Every Day     Current packs/day: 1.50     Average packs/day: 1.5 packs/day for 21.6 years (32.4 ttl pk-yrs)     Types: Cigarettes     Start date: 2002    Smokeless tobacco: Never    Tobacco comments:     5 cigs a day   Substance Use Topics    Alcohol use: Never    Drug use: Never     Review of Systems    Physical Exam     Initial Vitals [07/31/23 1753]   BP Pulse Resp Temp SpO2   120/70 (!) 140 18 100 °F (37.8 °C) 96 %      MAP       --         Physical Exam    Medical Screening Exam   See Full Note    ED Course   Procedures  Labs Reviewed   COMPREHENSIVE METABOLIC PANEL - Abnormal; Notable for the following  components:       Result Value    Chloride 109 (*)     Glucose 112 (*)     AST 9 (*)     All other components within normal limits   CBC WITH DIFFERENTIAL - Abnormal; Notable for the following components:    RBC 3.70 (*)     .5 (*)     MCH 34.3 (*)     MPV 8.9 (*)     Neutrophils % 75.7 (*)     Lymphocytes % 17.7 (*)     Eosinophils % 0.9 (*)     All other components within normal limits   CBC W/ AUTO DIFFERENTIAL    Narrative:     The following orders were created for panel order CBC auto differential.  Procedure                               Abnormality         Status                     ---------                               -----------         ------                     CBC with Differential[[id number]]        Abnormal            Final result                 Please view results for these tests on the individual orders.          Imaging Results    None          Medications   sodium chloride 0.9% bolus 1,000 mL 1,000 mL (1,000 mLs Intravenous New Bag 7/31/23 2468)                             Clinical Impression:   Final diagnoses:  [R00.0] Tachycardia  [E86.0] Dehydration (Primary)        ED Disposition Condition    Discharge Stable          ED Prescriptions    None       Follow-up Information       Follow up With Specialties Details Why Contact Info    Lesa Peterson DO Family Medicine Schedule an appointment as soon as possible for a visit in 2 days  [address]  [facility] [address]  [phone]               CHANO Kasper  07/31/23 8040

## 2023-08-01 NOTE — DISCHARGE INSTRUCTIONS
Increase oral intake. You may want to attempt Ensure or Boost if unable to tolerate normal diet.  Keep scheduled follow-up appointments with Orthopedics and ENT as discussed.  Return to the emergency department for any new or worsening symptoms.

## 2023-08-03 DIAGNOSIS — M89.8X1 PAIN OF RIGHT CLAVICLE: Primary | ICD-10-CM

## 2023-08-04 ENCOUNTER — HOSPITAL ENCOUNTER (OUTPATIENT)
Dept: RADIOLOGY | Facility: HOSPITAL | Age: 38
Discharge: HOME | End: 2023-08-04
Attending: ORTHOPAEDIC SURGERY
Payer: MEDICARE

## 2023-08-04 ENCOUNTER — OFFICE VISIT (OUTPATIENT)
Dept: ORTHOPEDICS | Facility: CLINIC | Age: 38
End: 2023-08-04
Payer: MEDICARE

## 2023-08-04 DIAGNOSIS — S42.034A CLOSED NONDISPLACED FRACTURE OF ACROMIAL END OF RIGHT CLAVICLE, INITIAL ENCOUNTER: ICD-10-CM

## 2023-08-04 DIAGNOSIS — M89.8X1 PAIN OF RIGHT CLAVICLE: ICD-10-CM

## 2023-08-04 DIAGNOSIS — Z09 FOLLOW-UP EXAMINATION, FOLLOWING OTHER SURGERY: Primary | ICD-10-CM

## 2023-08-04 PROCEDURE — 99212 OFFICE O/P EST SF 10 MIN: CPT | Mod: PBBFAC,25 | Performed by: ORTHOPAEDIC SURGERY

## 2023-08-04 PROCEDURE — 99213 PR OFFICE/OUTPT VISIT, EST, LEVL III, 20-29 MIN: ICD-10-PCS | Mod: S$PBB,57,, | Performed by: ORTHOPAEDIC SURGERY

## 2023-08-04 PROCEDURE — 99213 OFFICE O/P EST LOW 20 MIN: CPT | Mod: S$PBB,57,, | Performed by: ORTHOPAEDIC SURGERY

## 2023-08-04 PROCEDURE — 23500 CLTX CLAVICULAR FX W/O MNPJ: CPT | Mod: S$PBB,RT,, | Performed by: ORTHOPAEDIC SURGERY

## 2023-08-04 PROCEDURE — 23500 PR CLOSED RX CLAVICLE FRACTURE: ICD-10-PCS | Mod: S$PBB,RT,, | Performed by: ORTHOPAEDIC SURGERY

## 2023-08-04 PROCEDURE — 73000 XR CLAVICLE RIGHT: ICD-10-PCS | Mod: 26,RT,, | Performed by: ORTHOPAEDIC SURGERY

## 2023-08-04 PROCEDURE — 23500 CLTX CLAVICULAR FX W/O MNPJ: CPT | Mod: PBBFAC | Performed by: ORTHOPAEDIC SURGERY

## 2023-08-04 PROCEDURE — 73000 X-RAY EXAM OF COLLAR BONE: CPT | Mod: TC,RT

## 2023-08-04 PROCEDURE — 73000 X-RAY EXAM OF COLLAR BONE: CPT | Mod: 26,RT,, | Performed by: ORTHOPAEDIC SURGERY

## 2023-08-04 RX ORDER — KETOROLAC TROMETHAMINE 10 MG/1
10 TABLET, FILM COATED ORAL EVERY 6 HOURS
Qty: 20 TABLET | Refills: 0 | Status: SHIPPED | OUTPATIENT
Start: 2023-08-04 | End: 2023-08-09

## 2023-08-04 NOTE — PROGRESS NOTES
CC:   Chief Complaint   Patient presents with    Follow-up     RT CLAVICLE FX        PREVIOUS INFO:  Patient has been in the emergency room on July 6 July 12th July 17th July 24th July 27th and July 31st        HISTORY:   8/4/2023    Branden Hernandez  is a 37 y.o. knocked over by a dog referred to us for a nondisplaced distal clavicle fracture on the right she fell on her shoulder  Patient is on medical marijuana I am not for sure for what but is upset the ER would not write her narcotics she is been going back getting multiple Toradol shots      PAST MEDICAL HISTORY:   Past Medical History:   Diagnosis Date    Allergic rhinitis 11/30/2022    Cardiomyopathy 11/22/2022    Insomnia 8/31/2022    Intracranial aneurysm 2 mm evaluated by Dr. Badillo 2021 5/12/2021    Formatting of this note might be different from the original. Very small, left cavernous, 2 mm + Family history, aunt on dad's side    MDD (major depressive disorder) 8/31/2022    Migraine 11/22/2022    Osteoarthritis 1/26/2022    PTSD (post-traumatic stress disorder) 8/31/2022    Restless legs 3/1/2022    Sleep apnea 4/6/2021          PAST SURGICAL HISTORY:   Past Surgical History:   Procedure Laterality Date    HYSTERECTOMY      LAPAROTOMY            ALLERGIES:   Review of patient's allergies indicates:   Allergen Reactions    Arava [leflunomide]     Enbrel [etanercept]     Ketorolac     Latex Other (See Comments)    Penicillins     Levofloxacin Rash        MEDICATIONS :    Current Outpatient Medications:     atogepant (QULIPTA) 60 mg Tab, 60 mg., Disp: , Rfl:     BOTOX 200 unit SolR, 200 Units., Disp: , Rfl:     dextroamphetamine-amphetamine (ADDERALL) 15 mg tablet, Take 15 mg by mouth 2 (two) times daily., Disp: , Rfl:     ergocalciferol (ERGOCALCIFEROL) 50,000 unit Cap, Take one capsule weekly for 12 weeks then reduce to one capsule monthly, Disp: 12 capsule, Rfl: 1    estradiol valerate (DELESTROGEN) 20 mg/mL injection, Inject 20 mg into the  muscle every 30 days., Disp: , Rfl:     fluticasone propionate (FLONASE) 50 mcg/actuation nasal spray, by Each Nostril route., Disp: , Rfl:     ketorolac 0.5% (ACULAR) 0.5 % Drop, SMARTSI Drop(s) In Eye(s) 4 Times Daily PRN, Disp: , Rfl:     leflunomide (ARAVA) 10 MG Tab, 1 tablet., Disp: , Rfl:     meloxicam (MOBIC) 15 MG tablet, Take 1 tablet (15 mg total) by mouth once daily., Disp: 90 tablet, Rfl: 1    montelukast (SINGULAIR) 10 mg tablet, TAKE ONE TABLET BY MOUTH ONCE DAILY, Disp: 30 tablet, Rfl: 3    nystatin (MYCOSTATIN) 100,000 unit/mL suspension, Take 5 mLs (500,000 Units total) by mouth 4 (four) times daily. for 10 days, Disp: 200 mL, Rfl: 0    omeprazole (PRILOSEC) 20 MG capsule, Take 1 capsule (20 mg total) by mouth once daily., Disp: 30 capsule, Rfl: 3    potassium chloride SA (K-DUR,KLOR-CON M) 10 MEQ tablet, Take 1 tablet (10 mEq total) by mouth once daily., Disp: 90 tablet, Rfl: 1    prochlorperazine (COMPAZINE) 10 MG tablet, prochlorperazine maleate 10 mg tablet  TAKE ONE TABLET BY MOUTH THREE TIMES DAILY, Disp: , Rfl:     promethazine (PHENERGAN) 25 MG tablet, Take one tablet every 8 hours as needed for headache or nausea, no more than 2 in a day or 2 days in a week, no driving when taking this medication, Disp: 30 tablet, Rfl: 3    QUEtiapine (SEROQUEL) 50 MG tablet, TAKE 1 TABLET (50 MG TOTAL) BY MOUTH NIGHTLY. Strength: 50 mg, Disp: 30 tablet, Rfl: 3    rimegepant (NURTEC) 75 mg odt, Nurtec ODT 75 mg disintegrating tablet, Disp: , Rfl:     tiZANidine (ZANAFLEX) 4 MG tablet, Take 4 mg by mouth 2 (two) times daily as needed., Disp: , Rfl:     traZODone (DESYREL) 100 MG tablet, Take 100 mg by mouth every evening., Disp: , Rfl:     UBRELVY 100 mg tablet, Take by mouth., Disp: , Rfl:      SOCIAL HISTORY:   Social History     Socioeconomic History    Marital status: Single   Occupational History    Occupation: disabled   Tobacco Use    Smoking status: Every Day     Current packs/day: 1.50      Average packs/day: 1.5 packs/day for 21.6 years (32.4 ttl pk-yrs)     Types: Cigarettes     Start date: 2002    Smokeless tobacco: Never    Tobacco comments:     5 cigs a day   Substance and Sexual Activity    Alcohol use: Never    Drug use: Never    Sexual activity: Yes     Partners: Male        ROS    FAMILY HISTORY:   Family History   Problem Relation Age of Onset    Diabetes Mother     Bipolar disorder Mother     Hypertension Mother     Bipolar disorder Father     Bipolar disorder Sister     Diabetes Sister     Hypertension Sister     Seizures Brother     Bipolar disorder Brother     Hypertension Brother     Stroke Maternal Grandmother     Cancer Maternal Grandmother           PHYSICAL EXAM: There were no vitals filed for this visit.            There is no height or weight on file to calculate BMI.     In general, this is a well-developed, well-nourished female . The patient is alert, oriented and cooperative.      HEENT:  Normocephalic, atraumatic.  Extraocular movements are intact bilaterally.  The oropharynx is benign.       NECK:  Nontender with good range of motion.      PULMONARY: Respirations are even and non-labored.       CARDIOVASCULAR: Pulses regular by peripheral palpation.       ABDOMEN:  Soft, non-tender, non-distended.        EXTREMITIES:  Tender at the AC joint no obvious deformity    Ortho Exam      RADIOGRAPHIC FINDINGS:  X-rays right clavicle multiple views including APs transscapular lateral and axillary lateral of the clavicle distal clavicle fracture good alignment.  Distal clavicle fracture      .      IMPRESSION:  Distal clavicle fracture a will write her some Toradol continue the sling    PLAN:  Toradol follow-up in a month x-rays right shoulder        No follow-ups on file.         Rodrigue Baldwin III      (Subject to voice recognition error, transcription service not allowed)

## 2023-08-10 ENCOUNTER — TELEPHONE (OUTPATIENT)
Dept: EMERGENCY MEDICINE | Facility: HOSPITAL | Age: 38
End: 2023-08-10
Payer: MEDICARE

## 2023-08-14 DIAGNOSIS — M25.562 PAIN IN BOTH KNEES, UNSPECIFIED CHRONICITY: Primary | ICD-10-CM

## 2023-08-14 DIAGNOSIS — M25.561 PAIN IN BOTH KNEES, UNSPECIFIED CHRONICITY: Primary | ICD-10-CM

## 2023-08-21 ENCOUNTER — HOSPITAL ENCOUNTER (EMERGENCY)
Facility: HOSPITAL | Age: 38
Discharge: LEFT BEFORE DISCHARGE | End: 2023-08-21
Payer: MEDICARE

## 2023-08-21 VITALS
DIASTOLIC BLOOD PRESSURE: 63 MMHG | SYSTOLIC BLOOD PRESSURE: 108 MMHG | HEIGHT: 63 IN | TEMPERATURE: 99 F | RESPIRATION RATE: 18 BRPM | OXYGEN SATURATION: 95 % | HEART RATE: 115 BPM | WEIGHT: 115 LBS | BODY MASS INDEX: 20.38 KG/M2

## 2023-08-21 DIAGNOSIS — E87.6 HYPOKALEMIA: Primary | ICD-10-CM

## 2023-08-21 DIAGNOSIS — D72.829 LEUKOCYTOSIS: ICD-10-CM

## 2023-08-21 LAB
ANION GAP SERPL CALCULATED.3IONS-SCNC: 7 MMOL/L (ref 7–16)
BASOPHILS # BLD AUTO: 0.02 K/UL (ref 0–0.2)
BASOPHILS NFR BLD AUTO: 0.1 % (ref 0–1)
BUN SERPL-MCNC: 10 MG/DL (ref 7–18)
BUN/CREAT SERPL: 12 (ref 6–20)
CALCIUM SERPL-MCNC: 8.9 MG/DL (ref 8.5–10.1)
CHLORIDE SERPL-SCNC: 107 MMOL/L (ref 98–107)
CO2 SERPL-SCNC: 27 MMOL/L (ref 21–32)
CREAT SERPL-MCNC: 0.81 MG/DL (ref 0.55–1.02)
DIFFERENTIAL METHOD BLD: ABNORMAL
EGFR (NO RACE VARIABLE) (RUSH/TITUS): 95 ML/MIN/1.73M2
EOSINOPHIL # BLD AUTO: 0.01 K/UL (ref 0–0.5)
EOSINOPHIL NFR BLD AUTO: 0.1 % (ref 1–4)
ERYTHROCYTE [DISTWIDTH] IN BLOOD BY AUTOMATED COUNT: 13.2 % (ref 11.5–14.5)
GLUCOSE SERPL-MCNC: 101 MG/DL (ref 74–106)
HCT VFR BLD AUTO: 38.2 % (ref 38–47)
HGB BLD-MCNC: 13.3 G/DL (ref 12–16)
IMM GRANULOCYTES # BLD AUTO: 0.12 K/UL (ref 0–0.04)
IMM GRANULOCYTES NFR BLD: 0.6 % (ref 0–0.4)
LYMPHOCYTES # BLD AUTO: 1.54 K/UL (ref 1–4.8)
LYMPHOCYTES NFR BLD AUTO: 7.9 % (ref 27–41)
MCH RBC QN AUTO: 34.6 PG (ref 27–31)
MCHC RBC AUTO-ENTMCNC: 34.8 G/DL (ref 32–36)
MCV RBC AUTO: 99.5 FL (ref 80–96)
MONOCYTES # BLD AUTO: 1.31 K/UL (ref 0–0.8)
MONOCYTES NFR BLD AUTO: 6.7 % (ref 2–6)
MPC BLD CALC-MCNC: 8.8 FL (ref 9.4–12.4)
NEUTROPHILS # BLD AUTO: 16.43 K/UL (ref 1.8–7.7)
NEUTROPHILS NFR BLD AUTO: 84.6 % (ref 53–65)
NRBC # BLD AUTO: 0 X10E3/UL
NRBC, AUTO (.00): 0 %
PLATELET # BLD AUTO: 292 K/UL (ref 150–400)
POTASSIUM SERPL-SCNC: 2.6 MMOL/L (ref 3.5–5.1)
RBC # BLD AUTO: 3.84 M/UL (ref 4.2–5.4)
SODIUM SERPL-SCNC: 138 MMOL/L (ref 136–145)
WBC # BLD AUTO: 19.43 K/UL (ref 4.5–11)

## 2023-08-21 PROCEDURE — 85025 COMPLETE CBC W/AUTO DIFF WBC: CPT | Performed by: NURSE PRACTITIONER

## 2023-08-21 PROCEDURE — 99284 EMERGENCY DEPT VISIT MOD MDM: CPT | Mod: 25

## 2023-08-21 PROCEDURE — 80048 BASIC METABOLIC PNL TOTAL CA: CPT | Performed by: NURSE PRACTITIONER

## 2023-08-21 PROCEDURE — 99284 PR EMERGENCY DEPT VISIT,LEVEL IV: ICD-10-PCS | Mod: ,,, | Performed by: NURSE PRACTITIONER

## 2023-08-21 PROCEDURE — 99284 EMERGENCY DEPT VISIT MOD MDM: CPT | Mod: ,,, | Performed by: NURSE PRACTITIONER

## 2023-08-21 RX ORDER — POTASSIUM CHLORIDE 20 MEQ/1
60 TABLET, EXTENDED RELEASE ORAL
Status: DISCONTINUED | OUTPATIENT
Start: 2023-08-21 | End: 2023-08-21 | Stop reason: HOSPADM

## 2023-08-21 NOTE — ED TRIAGE NOTES
Patient presents to the ED with chief complaint of sore throat and dehydration.  Patient states she has walked a long way and is feeling dizzy.

## 2023-08-21 NOTE — ED PROVIDER NOTES
Encounter Date: 8/21/2023       History     Chief Complaint   Patient presents with    Dehydration     37-year-old female presents to the emergency department to be evaluated because she is been outside in the heat a lot recently.  She is concerned that she may be dehydrated.  Denies any runny nose, cough, nausea, vomiting, diarrhea, known sick contacts.    The history is provided by the patient.     Review of patient's allergies indicates:   Allergen Reactions    Arava [leflunomide]     Enbrel [etanercept]     Latex Other (See Comments)    Penicillins     Levofloxacin Rash     Past Medical History:   Diagnosis Date    Allergic rhinitis 11/30/2022    Cardiomyopathy 11/22/2022    Insomnia 8/31/2022    Intracranial aneurysm 2 mm evaluated by Dr. Badillo 2021 5/12/2021    Formatting of this note might be different from the original. Very small, left cavernous, 2 mm + Family history, aunt on dad's side    MDD (major depressive disorder) 8/31/2022    Migraine 11/22/2022    Osteoarthritis 1/26/2022    PTSD (post-traumatic stress disorder) 8/31/2022    Restless legs 3/1/2022    Sleep apnea 4/6/2021     Past Surgical History:   Procedure Laterality Date    HYSTERECTOMY      LAPAROTOMY       Family History   Problem Relation Age of Onset    Diabetes Mother     Bipolar disorder Mother     Hypertension Mother     Bipolar disorder Father     Bipolar disorder Sister     Diabetes Sister     Hypertension Sister     Seizures Brother     Bipolar disorder Brother     Hypertension Brother     Stroke Maternal Grandmother     Cancer Maternal Grandmother      Social History     Tobacco Use    Smoking status: Every Day     Current packs/day: 1.50     Average packs/day: 1.5 packs/day for 21.6 years (32.5 ttl pk-yrs)     Types: Cigarettes     Start date: 2002    Smokeless tobacco: Never    Tobacco comments:     5 cigs a day   Substance Use Topics    Alcohol use: Never    Drug use: Never     Review of Systems   Constitutional:  Negative for  chills and fever.   Gastrointestinal:  Negative for diarrhea, nausea and vomiting.   All other systems reviewed and are negative.      Physical Exam     Initial Vitals [08/21/23 1501]   BP Pulse Resp Temp SpO2   108/63 (!) 115 18 98.8 °F (37.1 °C) 95 %      MAP       --         Physical Exam    Vitals reviewed.  Constitutional: She appears well-developed and well-nourished.   HENT:   Head: Normocephalic and atraumatic.   Eyes: EOM are normal. Pupils are equal, round, and reactive to light.   Neck: Neck supple.   Cardiovascular:  Normal rate and regular rhythm.           Pulmonary/Chest: Breath sounds normal.   Abdominal: Abdomen is soft. Bowel sounds are normal. She exhibits no distension and no mass. There is no abdominal tenderness. There is no rebound and no guarding.   Musculoskeletal:         General: Normal range of motion.      Cervical back: Neck supple.     Neurological: She is alert and oriented to person, place, and time. She has normal strength. GCS score is 15. GCS eye subscore is 4. GCS verbal subscore is 5. GCS motor subscore is 6.   Skin: Skin is warm and dry. Capillary refill takes less than 2 seconds.   Psychiatric: She has a normal mood and affect.         Medical Screening Exam   See Full Note    ED Course   Procedures  Labs Reviewed   BASIC METABOLIC PANEL - Abnormal; Notable for the following components:       Result Value    Potassium 2.6 (*)     All other components within normal limits   CBC WITH DIFFERENTIAL - Abnormal; Notable for the following components:    WBC 19.43 (*)     RBC 3.84 (*)     MCV 99.5 (*)     MCH 34.6 (*)     MPV 8.8 (*)     Neutrophils % 84.6 (*)     Lymphocytes % 7.9 (*)     Monocytes % 6.7 (*)     Eosinophils % 0.1 (*)     Immature Granulocytes % 0.6 (*)     Neutrophils, Abs 16.43 (*)     Monocytes, Absolute 1.31 (*)     Immature Granulocytes, Absolute 0.12 (*)     All other components within normal limits   CBC W/ AUTO DIFFERENTIAL    Narrative:     The following  orders were created for panel order CBC auto differential.  Procedure                               Abnormality         Status                     ---------                               -----------         ------                     CBC with Differential[[id number]]        Abnormal            Final result                 Please view results for these tests on the individual orders.   URINALYSIS   DRUG SCREEN, URINE (BEAKER)          Imaging Results              X-Ray Chest PA And Lateral (Final result)  Result time 23 17:50:13      Final result by Ted Valerio MD (23 17:50:13)                   Impression:      No acute cardiopulmonary process.    Place of service: [facility]      Electronically signed by: Ted Valerio  Date:    2023  Time:    17:50               Narrative:    EXAMINATION:  XR CHEST PA AND LATERAL    CLINICAL HISTORY:  Elevated white blood cell count, unspecified    TECHNIQUE:  PA and lateral    COMPARISON:  Prior radiograph 10/26/2016    FINDINGS:  The cardiomediastinal silhouette is within normal limits. The lungs are clear. There is no pneumothorax or pleural effusion.    There is no acute osseous or soft tissue abnormality.                                       Medications   potassium chloride SA CR tablet 60 mEq (has no administration in time range)     Medical Decision Making  Amount and/or Complexity of Data Reviewed  Labs: ordered. Decision-making details documented in ED Course.  Radiology: ordered.    Risk  Prescription drug management.               ED Course as of 08/21/23 1931   Mon Aug 21, 2023   1714 Potassium(!): 2.6  KCL ordered to be supplemented orally. [BM]      ED Course User Index  [BM] Caty Claire FNP               Medical Decision Makin-year-old female presents to the emergency department to be evaluated because she is been outside in the heat a lot recently.  She is concerned that she may be dehydrated.  Denies any runny  nose, cough, nausea, vomiting, diarrhea, known sick contacts.    Patient discussed with me at shift change per MANUEL Gan NP. Potassium found to be low, supplementation ordered. WBC count elevated, awaiting results of additional work up. Notified per nursing staff when they went to administer potassium patient had eloped.   Clinical Impression:   Final diagnoses:  [D72.829] Leukocytosis  [E87.6] Hypokalemia (Primary)        ED Disposition Condition    Eloped                 Caty Claire FNP  08/21/23 4767

## 2023-08-22 ENCOUNTER — HOSPITAL ENCOUNTER (EMERGENCY)
Facility: HOSPITAL | Age: 38
Discharge: LEFT BEFORE DISCHARGE | End: 2023-08-23
Payer: MEDICARE

## 2023-08-22 ENCOUNTER — TELEPHONE (OUTPATIENT)
Dept: EMERGENCY MEDICINE | Facility: HOSPITAL | Age: 38
End: 2023-08-22
Payer: MEDICARE

## 2023-08-22 VITALS
RESPIRATION RATE: 18 BRPM | OXYGEN SATURATION: 95 % | DIASTOLIC BLOOD PRESSURE: 73 MMHG | HEART RATE: 90 BPM | TEMPERATURE: 98 F | SYSTOLIC BLOOD PRESSURE: 138 MMHG

## 2023-08-22 LAB
B-HCG UR QL: NEGATIVE
CTP QC/QA: YES

## 2023-08-22 PROCEDURE — 99900 PR LEFT WITHOUTBEING SEEN-EMERGENCY: ICD-10-PCS | Mod: ,,, | Performed by: NURSE PRACTITIONER

## 2023-08-22 PROCEDURE — 80307 DRUG TEST PRSMV CHEM ANLYZR: CPT | Performed by: EMERGENCY MEDICINE

## 2023-08-22 PROCEDURE — 99282 EMERGENCY DEPT VISIT SF MDM: CPT

## 2023-08-22 PROCEDURE — 81001 URINALYSIS AUTO W/SCOPE: CPT | Performed by: EMERGENCY MEDICINE

## 2023-08-22 PROCEDURE — 99900 PR LEFT WITHOUTBEING SEEN-EMERGENCY: CPT | Mod: ,,, | Performed by: NURSE PRACTITIONER

## 2023-08-22 PROCEDURE — 81025 URINE PREGNANCY TEST: CPT | Performed by: EMERGENCY MEDICINE

## 2023-08-22 PROCEDURE — 87086 URINE CULTURE/COLONY COUNT: CPT | Performed by: EMERGENCY MEDICINE

## 2023-08-23 LAB
AMPHET UR QL SCN: NEGATIVE
BACTERIA #/AREA URNS HPF: ABNORMAL /HPF
BARBITURATES UR QL SCN: NEGATIVE
BENZODIAZ METAB UR QL SCN: NEGATIVE
BILIRUB UR QL STRIP: NEGATIVE
CANNABINOIDS UR QL SCN: POSITIVE
CLARITY UR: ABNORMAL
COCAINE UR QL SCN: NEGATIVE
COLOR UR: YELLOW
GLUCOSE UR STRIP-MCNC: NORMAL MG/DL
KETONES UR STRIP-SCNC: NEGATIVE MG/DL
LEUKOCYTE ESTERASE UR QL STRIP: ABNORMAL
MUCOUS, UA: ABNORMAL /LPF
NITRITE UR QL STRIP: NEGATIVE
OPIATES UR QL SCN: NEGATIVE
PCP UR QL SCN: NEGATIVE
PH UR STRIP: 6.5 PH UNITS
PROT UR QL STRIP: 30
RBC # UR STRIP: ABNORMAL /UL
RBC #/AREA URNS HPF: 23 /HPF
SP GR UR STRIP: 1.03
SQUAMOUS #/AREA URNS LPF: ABNORMAL /HPF
UROBILINOGEN UR STRIP-ACNC: 3 MG/DL
WBC #/AREA URNS HPF: >182 /HPF

## 2023-08-24 LAB — UA COMPLETE W REFLEX CULTURE PNL UR: NORMAL

## 2023-11-19 ENCOUNTER — HOSPITAL ENCOUNTER (EMERGENCY)
Facility: HOSPITAL | Age: 38
Discharge: HOME | End: 2023-11-19
Payer: MEDICARE

## 2023-11-19 VITALS
DIASTOLIC BLOOD PRESSURE: 58 MMHG | HEIGHT: 63 IN | WEIGHT: 106 LBS | RESPIRATION RATE: 15 BRPM | SYSTOLIC BLOOD PRESSURE: 112 MMHG | TEMPERATURE: 98 F | OXYGEN SATURATION: 96 % | HEART RATE: 88 BPM | BODY MASS INDEX: 18.78 KG/M2

## 2023-11-19 DIAGNOSIS — E83.42 HYPOMAGNESEMIA: ICD-10-CM

## 2023-11-19 DIAGNOSIS — U07.1 COVID: ICD-10-CM

## 2023-11-19 DIAGNOSIS — N39.0 ACUTE URINARY TRACT INFECTION: ICD-10-CM

## 2023-11-19 DIAGNOSIS — E87.6 HYPOKALEMIA: ICD-10-CM

## 2023-11-19 DIAGNOSIS — F19.10 POLYSUBSTANCE ABUSE: Primary | ICD-10-CM

## 2023-11-19 LAB
ALBUMIN SERPL BCP-MCNC: 2.1 G/DL (ref 3.5–5)
ALBUMIN/GLOB SERPL: 0.5 {RATIO}
ALP SERPL-CCNC: 132 U/L (ref 37–98)
ALT SERPL W P-5'-P-CCNC: 21 U/L (ref 13–56)
AMPHET UR QL SCN: POSITIVE
ANION GAP SERPL CALCULATED.3IONS-SCNC: 6 MMOL/L (ref 7–16)
AST SERPL W P-5'-P-CCNC: 14 U/L (ref 15–37)
BACTERIA #/AREA URNS HPF: ABNORMAL /HPF
BARBITURATES UR QL SCN: NEGATIVE
BASOPHILS # BLD AUTO: 0.06 K/UL (ref 0–0.2)
BASOPHILS NFR BLD AUTO: 0.7 % (ref 0–1)
BENZODIAZ METAB UR QL SCN: NEGATIVE
BILIRUB SERPL-MCNC: 0.2 MG/DL (ref ?–1.2)
BILIRUB UR QL STRIP: NEGATIVE
BUN SERPL-MCNC: 10 MG/DL (ref 7–18)
BUN/CREAT SERPL: 9 (ref 6–20)
CALCIUM SERPL-MCNC: 8.6 MG/DL (ref 8.5–10.1)
CANNABINOIDS UR QL SCN: POSITIVE
CHLORIDE SERPL-SCNC: 111 MMOL/L (ref 98–107)
CLARITY UR: CLEAR
CO2 SERPL-SCNC: 30 MMOL/L (ref 21–32)
COCAINE UR QL SCN: NEGATIVE
COLOR UR: COLORLESS
CREAT SERPL-MCNC: 1.14 MG/DL (ref 0.55–1.02)
DIFFERENTIAL METHOD BLD: ABNORMAL
EGFR (NO RACE VARIABLE) (RUSH/TITUS): 63 ML/MIN/1.73M2
EOSINOPHIL # BLD AUTO: 0.18 K/UL (ref 0–0.5)
EOSINOPHIL NFR BLD AUTO: 2 % (ref 1–4)
ERYTHROCYTE [DISTWIDTH] IN BLOOD BY AUTOMATED COUNT: 14.5 % (ref 11.5–14.5)
FLUAV AG UPPER RESP QL IA.RAPID: NEGATIVE
FLUBV AG UPPER RESP QL IA.RAPID: NEGATIVE
GLOBULIN SER-MCNC: 4 G/DL (ref 2–4)
GLUCOSE SERPL-MCNC: 104 MG/DL (ref 74–106)
GLUCOSE UR STRIP-MCNC: NORMAL MG/DL
HCT VFR BLD AUTO: 29.6 % (ref 38–47)
HGB BLD-MCNC: 9.4 G/DL (ref 12–16)
IMM GRANULOCYTES # BLD AUTO: 0.06 K/UL (ref 0–0.04)
IMM GRANULOCYTES NFR BLD: 0.7 % (ref 0–0.4)
KETONES UR STRIP-SCNC: NEGATIVE MG/DL
LEUKOCYTE ESTERASE UR QL STRIP: ABNORMAL
LYMPHOCYTES # BLD AUTO: 2.65 K/UL (ref 1–4.8)
LYMPHOCYTES NFR BLD AUTO: 29.8 % (ref 27–41)
MAGNESIUM SERPL-MCNC: 1.6 MG/DL (ref 1.7–2.3)
MCH RBC QN AUTO: 30.1 PG (ref 27–31)
MCHC RBC AUTO-ENTMCNC: 31.8 G/DL (ref 32–36)
MCV RBC AUTO: 94.9 FL (ref 80–96)
MONOCYTES # BLD AUTO: 0.55 K/UL (ref 0–0.8)
MONOCYTES NFR BLD AUTO: 6.2 % (ref 2–6)
MPC BLD CALC-MCNC: 8.5 FL (ref 9.4–12.4)
NEUTROPHILS # BLD AUTO: 5.38 K/UL (ref 1.8–7.7)
NEUTROPHILS NFR BLD AUTO: 60.6 % (ref 53–65)
NITRITE UR QL STRIP: NEGATIVE
NRBC # BLD AUTO: 0 X10E3/UL
NRBC, AUTO (.00): 0 %
OPIATES UR QL SCN: NEGATIVE
PCP UR QL SCN: NEGATIVE
PH UR STRIP: 6.5 PH UNITS
PLATELET # BLD AUTO: 377 K/UL (ref 150–400)
POTASSIUM SERPL-SCNC: 2.4 MMOL/L (ref 3.5–5.1)
PROT SERPL-MCNC: 6.1 G/DL (ref 6.4–8.2)
PROT UR QL STRIP: NEGATIVE
RBC # BLD AUTO: 3.12 M/UL (ref 4.2–5.4)
RBC # UR STRIP: NEGATIVE /UL
RBC #/AREA URNS HPF: 1 /HPF
SARS-COV-2 RDRP RESP QL NAA+PROBE: POSITIVE
SODIUM SERPL-SCNC: 145 MMOL/L (ref 136–145)
SP GR UR STRIP: 1.01
SQUAMOUS #/AREA URNS LPF: ABNORMAL /HPF
UROBILINOGEN UR STRIP-ACNC: NORMAL MG/DL
WBC # BLD AUTO: 8.88 K/UL (ref 4.5–11)
WBC #/AREA URNS HPF: 6 /HPF

## 2023-11-19 PROCEDURE — 80307 DRUG TEST PRSMV CHEM ANLYZR: CPT | Performed by: NURSE PRACTITIONER

## 2023-11-19 PROCEDURE — 99285 EMERGENCY DEPT VISIT HI MDM: CPT | Mod: ,,, | Performed by: NURSE PRACTITIONER

## 2023-11-19 PROCEDURE — 99285 PR EMERGENCY DEPT VISIT,LEVEL V: ICD-10-PCS | Mod: ,,, | Performed by: NURSE PRACTITIONER

## 2023-11-19 PROCEDURE — 63600175 PHARM REV CODE 636 W HCPCS: Performed by: NURSE PRACTITIONER

## 2023-11-19 PROCEDURE — 96368 THER/DIAG CONCURRENT INF: CPT

## 2023-11-19 PROCEDURE — 85025 COMPLETE CBC W/AUTO DIFF WBC: CPT | Performed by: NURSE PRACTITIONER

## 2023-11-19 PROCEDURE — 96365 THER/PROPH/DIAG IV INF INIT: CPT

## 2023-11-19 PROCEDURE — 87635 SARS-COV-2 COVID-19 AMP PRB: CPT | Performed by: NURSE PRACTITIONER

## 2023-11-19 PROCEDURE — 99285 EMERGENCY DEPT VISIT HI MDM: CPT | Mod: 25

## 2023-11-19 PROCEDURE — 93010 EKG 12-LEAD: ICD-10-PCS | Mod: ,,, | Performed by: HOSPITALIST

## 2023-11-19 PROCEDURE — 87804 INFLUENZA ASSAY W/OPTIC: CPT | Mod: 59 | Performed by: NURSE PRACTITIONER

## 2023-11-19 PROCEDURE — 25000003 PHARM REV CODE 250: Performed by: NURSE PRACTITIONER

## 2023-11-19 PROCEDURE — 93010 ELECTROCARDIOGRAM REPORT: CPT | Mod: ,,, | Performed by: HOSPITALIST

## 2023-11-19 PROCEDURE — 93005 ELECTROCARDIOGRAM TRACING: CPT

## 2023-11-19 PROCEDURE — 83735 ASSAY OF MAGNESIUM: CPT | Performed by: NURSE PRACTITIONER

## 2023-11-19 PROCEDURE — 96366 THER/PROPH/DIAG IV INF ADDON: CPT

## 2023-11-19 PROCEDURE — 81001 URINALYSIS AUTO W/SCOPE: CPT | Mod: XB | Performed by: NURSE PRACTITIONER

## 2023-11-19 PROCEDURE — 80053 COMPREHEN METABOLIC PANEL: CPT | Performed by: NURSE PRACTITIONER

## 2023-11-19 RX ORDER — NITROFURANTOIN 25; 75 MG/1; MG/1
100 CAPSULE ORAL 2 TIMES DAILY
Qty: 20 CAPSULE | Refills: 0 | Status: SHIPPED | OUTPATIENT
Start: 2023-11-19 | End: 2023-11-29

## 2023-11-19 RX ORDER — MAGNESIUM SULFATE HEPTAHYDRATE 40 MG/ML
2 INJECTION, SOLUTION INTRAVENOUS
Status: COMPLETED | OUTPATIENT
Start: 2023-11-19 | End: 2023-11-19

## 2023-11-19 RX ORDER — POTASSIUM CHLORIDE 7.45 MG/ML
10 INJECTION INTRAVENOUS
Status: COMPLETED | OUTPATIENT
Start: 2023-11-19 | End: 2023-11-19

## 2023-11-19 RX ORDER — POTASSIUM CHLORIDE 750 MG/1
30 TABLET, EXTENDED RELEASE ORAL ONCE
Status: COMPLETED | OUTPATIENT
Start: 2023-11-19 | End: 2023-11-19

## 2023-11-19 RX ORDER — POTASSIUM CHLORIDE 20 MEQ/1
20 TABLET, EXTENDED RELEASE ORAL DAILY
Qty: 30 TABLET | Refills: 0 | Status: SHIPPED | OUTPATIENT
Start: 2023-11-19

## 2023-11-19 RX ORDER — VITS A,C,E/LUTEIN/MINERALS 300MCG-200
1 TABLET ORAL DAILY
Qty: 30 TABLET | Refills: 0 | Status: SHIPPED | OUTPATIENT
Start: 2023-11-19

## 2023-11-19 RX ADMIN — MAGNESIUM SULFATE HEPTAHYDRATE 2 G: 40 INJECTION, SOLUTION INTRAVENOUS at 04:11

## 2023-11-19 RX ADMIN — SODIUM CHLORIDE 1000 ML: 9 INJECTION, SOLUTION INTRAVENOUS at 04:11

## 2023-11-19 RX ADMIN — POTASSIUM CHLORIDE 10 MEQ: 7.46 INJECTION, SOLUTION INTRAVENOUS at 04:11

## 2023-11-19 RX ADMIN — POTASSIUM CHLORIDE 30 MEQ: 750 TABLET, EXTENDED RELEASE ORAL at 05:11

## 2023-11-19 NOTE — ED TRIAGE NOTES
"Pt states that she does not feel good & has no energy. Sleepy a lot this has been going on for "awhile". Pt refusing to answer questions.  "

## 2023-11-19 NOTE — DISCHARGE INSTRUCTIONS
Take medications as prescribed.  Quarantine x5 days.  Follow up with primary care provider in 2 days for recheck.  Return to the ER with new or worsening symptoms.

## 2023-11-19 NOTE — ED NOTES
Patient pulled IV out before magnesium infusion was completed. Informed provider and patient stated she was leaving without the rest of it

## 2023-11-19 NOTE — ED PROVIDER NOTES
Encounter Date: 11/19/2023       History     Chief Complaint   Patient presents with    General Illness     Patient presents to the ER with complaint of generalized weakness and body aches.  She states the symptoms have been going on for several months.  Patient was a very poor historian, with very little communication left prompted.  History is obtained from significant other who is at her bedside.  She denies fever.  Reports nonproductive cough.  No nausea vomiting or diarrhea.    The history is provided by the patient and a significant other. No  was used.     Review of patient's allergies indicates:   Allergen Reactions    Arava [leflunomide]     Enbrel [etanercept]     Latex Other (See Comments)    Penicillins     Levofloxacin Rash     Past Medical History:   Diagnosis Date    Allergic rhinitis 11/30/2022    Cardiomyopathy 11/22/2022    Insomnia 8/31/2022    Intracranial aneurysm 2 mm evaluated by Dr. Badillo 2021 5/12/2021    Formatting of this note might be different from the original. Very small, left cavernous, 2 mm + Family history, aunt on dad's side    MDD (major depressive disorder) 8/31/2022    Migraine 11/22/2022    Osteoarthritis 1/26/2022    PTSD (post-traumatic stress disorder) 8/31/2022    Restless legs 3/1/2022    Sleep apnea 4/6/2021     Past Surgical History:   Procedure Laterality Date    HYSTERECTOMY      LAPAROTOMY       Family History   Problem Relation Age of Onset    Diabetes Mother     Bipolar disorder Mother     Hypertension Mother     Bipolar disorder Father     Bipolar disorder Sister     Diabetes Sister     Hypertension Sister     Seizures Brother     Bipolar disorder Brother     Hypertension Brother     Stroke Maternal Grandmother     Cancer Maternal Grandmother      Social History     Tobacco Use    Smoking status: Every Day     Current packs/day: 1.50     Average packs/day: 1.5 packs/day for 21.9 years (32.8 ttl pk-yrs)     Types: Cigarettes     Start date:  2002    Smokeless tobacco: Never    Tobacco comments:     5 cigs a day   Substance Use Topics    Alcohol use: Never    Drug use: Never     Review of Systems   Constitutional:  Positive for fatigue.   Respiratory:  Positive for cough.    Neurological:  Positive for weakness.   Psychiatric/Behavioral:  Positive for dysphoric mood.    All other systems reviewed and are negative.      Physical Exam     Initial Vitals [11/19/23 1416]   BP Pulse Resp Temp SpO2   114/71 88 16 98.3 °F (36.8 °C) 98 %      MAP       --         Physical Exam    Nursing note and vitals reviewed.  Constitutional: She appears well-developed and well-nourished.   HENT:   Head: Normocephalic.   Right Ear: External ear normal.   Left Ear: External ear normal.   Nose: Nose normal.   Mouth/Throat: Oropharyngeal exudate present.   Eyes: EOM are normal.   Neck:   Normal range of motion.  Cardiovascular:  Normal rate, normal heart sounds and intact distal pulses.           Pulmonary/Chest: Breath sounds normal.   Abdominal: Bowel sounds are normal.   Musculoskeletal:         General: Normal range of motion.      Cervical back: Normal range of motion.     Neurological: She is alert and oriented to person, place, and time. She has normal strength. GCS score is 15. GCS eye subscore is 4. GCS verbal subscore is 5. GCS motor subscore is 6.   Skin: Skin is warm and dry. Capillary refill takes less than 2 seconds.   Psychiatric: Her speech is delayed. She is slowed and withdrawn. Cognition and memory are impaired. She exhibits a depressed mood.   Patient appears to be under the influence of drugs or alcohol. She is inattentive.         Medical Screening Exam   See Full Note    ED Course   Procedures  Labs Reviewed   SARS-COV-2 RNA AMPLIFICATION, QUAL - Abnormal; Notable for the following components:       Result Value    SARS COV-2 MOLECULAR Positive (*)     All other components within normal limits   URINALYSIS, REFLEX TO URINE CULTURE - Abnormal; Notable for  the following components:    Leukocytes, UA Small (*)     All other components within normal limits   DRUG SCREEN, URINE (BEAKER) - Abnormal; Notable for the following components:    Amphetamine, Urine Positive (*)     Cannabinoid, Urine Positive (*)     All other components within normal limits   COMPREHENSIVE METABOLIC PANEL - Abnormal; Notable for the following components:    Potassium 2.4 (*)     Chloride 111 (*)     Anion Gap 6 (*)     Creatinine 1.14 (*)     Total Protein 6.1 (*)     Albumin 2.1 (*)     Alk Phos 132 (*)     AST 14 (*)     All other components within normal limits   MAGNESIUM - Abnormal; Notable for the following components:    Magnesium 1.6 (*)     All other components within normal limits   CBC WITH DIFFERENTIAL - Abnormal; Notable for the following components:    RBC 3.12 (*)     Hemoglobin 9.4 (*)     Hematocrit 29.6 (*)     MCHC 31.8 (*)     MPV 8.5 (*)     Monocytes % 6.2 (*)     Immature Granulocytes % 0.7 (*)     Immature Granulocytes, Absolute 0.06 (*)     All other components within normal limits   URINALYSIS, MICROSCOPIC - Abnormal; Notable for the following components:    WBC, UA 6 (*)     Bacteria, UA Occasional (*)     Squamous Epithelial Cells, UA Occasional (*)     All other components within normal limits   RAPID INFLUENZA A/B - Normal   CBC W/ AUTO DIFFERENTIAL    Narrative:     The following orders were created for panel order CBC auto differential.  Procedure                               Abnormality         Status                     ---------                               -----------         ------                     CBC with Differential[[id number]]        Abnormal            Final result                 Please view results for these tests on the individual orders.   POCT OCCULT BLOOD (STOOL)          Imaging Results              X-Ray Chest AP Portable (In process)  Result time 11/19/23 15:38:40                     Medications   magnesium sulfate 2g in water 50mL IVPB  (premix) (2 g Intravenous New Bag 11/19/23 1600)   sodium chloride 0.9% bolus 1,000 mL 1,000 mL (0 mLs Intravenous Stopped 11/19/23 1715)   potassium chloride 10 mEq in 100 mL IVPB (0 mEq Intravenous Stopped 11/19/23 1715)   potassium chloride SA CR tablet 30 mEq (30 mEq Oral Given 11/19/23 1715)     Medical Decision Making  Patient presents to the ER with complaint of generalized weakness and body aches.  She states the symptoms have been going on for several months.  Patient was a very poor historian, with very little communication left prompted.  History is obtained from significant other who is at her bedside.  She denies fever.  Reports nonproductive cough.  No nausea vomiting or diarrhea.      Amount and/or Complexity of Data Reviewed  Independent Historian: friend  External Data Reviewed: labs.     Details: History of hypokalemia, history of drug polysubstance abuse  Labs: ordered.     Details: H&H 9/24  Potassium 2.4  Magnesium 1.6  COVID positive  UA positive for leukocytes and 6 white blood cells    Radiology: ordered.  ECG/medicine tests: ordered.     Details: Sinus rhythm rate of 84 beats per minute reviewed by Dr. Crooks no STEMI at 4:05 p.m.  Discussion of management or test interpretation with external provider(s): Initiate IV, collect lab work  1 L normal saline bolus  10 mEq potassium IV piggyback to treat hypokalemia  Magnesium 1 g IV piggyback to treat hypomagnesemia    Patient was requesting to leave magnesium drip is finishing.  Patient was offered to get IV antibiotics for her urinary tract infection patient declined.  States she would take medication at home.    Patient was discharged home with diagnosis of polysubstance abuse hypokalemia hypomagnesemia COVID and acute urinary tract infection.  She was given prescription for Macrobid potassium and magnesium to take as prescribed.  She was told to follow up with the primary care provider in 2 days if not improve.  She was told to quarantine  for 5 days and instructed on treating the symptoms of a viral infection.    Risk  OTC drugs.  Prescription drug management.                                   Clinical Impression:   Final diagnoses:  [E87.6] Hypokalemia  [F19.10] Polysubstance abuse (Primary)  [N39.0] Acute urinary tract infection  [U07.1] COVID  [E83.42] Hypomagnesemia        ED Disposition Condition    Discharge Stable          ED Prescriptions       Medication Sig Dispense Start Date End Date Auth. Provider    magnesium oxide 200 mg magnesium Tab Take 1 tablet by mouth Daily. 30 tablet 11/19/2023 -- Brenda Graham FNP    potassium chloride SA (K-DUR,KLOR-CON) 20 MEQ tablet Take 1 tablet (20 mEq total) by mouth once daily. 30 tablet 11/19/2023 -- Brenda Graham FNP    nitrofurantoin, macrocrystal-monohydrate, (MACROBID) 100 MG capsule Take 1 capsule (100 mg total) by mouth 2 (two) times daily. for 10 days 20 capsule 11/19/2023 11/29/2023 Brenda Graham FNP          Follow-up Information       Follow up With Specialties Details Why Contact Info    Lesa Peterson DO Family Medicine Schedule an appointment as soon as possible for a visit in 2 days If symptoms worsen [address]  [facility] [id number] [phone]               Brenda Graham FNP  11/19/23 1398

## 2023-12-03 ENCOUNTER — HOSPITAL ENCOUNTER (EMERGENCY)
Facility: HOSPITAL | Age: 38
Discharge: HOME | End: 2023-12-03
Payer: MEDICARE

## 2023-12-03 VITALS
OXYGEN SATURATION: 98 % | RESPIRATION RATE: 18 BRPM | DIASTOLIC BLOOD PRESSURE: 76 MMHG | BODY MASS INDEX: 18.52 KG/M2 | HEART RATE: 101 BPM | SYSTOLIC BLOOD PRESSURE: 110 MMHG | TEMPERATURE: 99 F | WEIGHT: 104.5 LBS | HEIGHT: 63 IN

## 2023-12-03 DIAGNOSIS — H66.91 RIGHT OTITIS MEDIA, UNSPECIFIED OTITIS MEDIA TYPE: ICD-10-CM

## 2023-12-03 DIAGNOSIS — J01.90 ACUTE SINUSITIS, RECURRENCE NOT SPECIFIED, UNSPECIFIED LOCATION: Primary | ICD-10-CM

## 2023-12-03 DIAGNOSIS — R05.9 COUGH: ICD-10-CM

## 2023-12-03 LAB
ALBUMIN SERPL BCP-MCNC: 3.1 G/DL (ref 3.5–5)
ALBUMIN/GLOB SERPL: 0.6 {RATIO}
ALP SERPL-CCNC: 165 U/L (ref 37–98)
ALT SERPL W P-5'-P-CCNC: 27 U/L (ref 13–56)
ANION GAP SERPL CALCULATED.3IONS-SCNC: 8 MMOL/L (ref 7–16)
AST SERPL W P-5'-P-CCNC: 15 U/L (ref 15–37)
BASOPHILS # BLD AUTO: 0.06 K/UL (ref 0–0.2)
BASOPHILS NFR BLD AUTO: 0.5 % (ref 0–1)
BILIRUB SERPL-MCNC: 0.3 MG/DL (ref ?–1.2)
BUN SERPL-MCNC: 10 MG/DL (ref 7–18)
BUN/CREAT SERPL: 11 (ref 6–20)
CALCIUM SERPL-MCNC: 9.5 MG/DL (ref 8.5–10.1)
CHLORIDE SERPL-SCNC: 104 MMOL/L (ref 98–107)
CO2 SERPL-SCNC: 29 MMOL/L (ref 21–32)
CREAT SERPL-MCNC: 0.87 MG/DL (ref 0.55–1.02)
DIFFERENTIAL METHOD BLD: ABNORMAL
EGFR (NO RACE VARIABLE) (RUSH/TITUS): 87 ML/MIN/1.73M2
EOSINOPHIL # BLD AUTO: 0.17 K/UL (ref 0–0.5)
EOSINOPHIL NFR BLD AUTO: 1.5 % (ref 1–4)
ERYTHROCYTE [DISTWIDTH] IN BLOOD BY AUTOMATED COUNT: 17.2 % (ref 11.5–14.5)
GLOBULIN SER-MCNC: 5.3 G/DL (ref 2–4)
GLUCOSE SERPL-MCNC: 102 MG/DL (ref 74–106)
HCT VFR BLD AUTO: 38.8 % (ref 38–47)
HGB BLD-MCNC: 12.3 G/DL (ref 12–16)
IMM GRANULOCYTES # BLD AUTO: 0.1 K/UL (ref 0–0.04)
IMM GRANULOCYTES NFR BLD: 0.9 % (ref 0–0.4)
LYMPHOCYTES # BLD AUTO: 2.6 K/UL (ref 1–4.8)
LYMPHOCYTES NFR BLD AUTO: 22.5 % (ref 27–41)
MCH RBC QN AUTO: 30.4 PG (ref 27–31)
MCHC RBC AUTO-ENTMCNC: 31.7 G/DL (ref 32–36)
MCV RBC AUTO: 95.8 FL (ref 80–96)
MONOCYTES # BLD AUTO: 0.78 K/UL (ref 0–0.8)
MONOCYTES NFR BLD AUTO: 6.8 % (ref 2–6)
MPC BLD CALC-MCNC: 8.6 FL (ref 9.4–12.4)
NEUTROPHILS # BLD AUTO: 7.82 K/UL (ref 1.8–7.7)
NEUTROPHILS NFR BLD AUTO: 67.8 % (ref 53–65)
NRBC # BLD AUTO: 0 X10E3/UL
NRBC, AUTO (.00): 0 %
PLATELET # BLD AUTO: 454 K/UL (ref 150–400)
POTASSIUM SERPL-SCNC: 3.8 MMOL/L (ref 3.5–5.1)
PROT SERPL-MCNC: 8.4 G/DL (ref 6.4–8.2)
RBC # BLD AUTO: 4.05 M/UL (ref 4.2–5.4)
SODIUM SERPL-SCNC: 137 MMOL/L (ref 136–145)
WBC # BLD AUTO: 11.53 K/UL (ref 4.5–11)

## 2023-12-03 PROCEDURE — 99284 PR EMERGENCY DEPT VISIT,LEVEL IV: ICD-10-PCS | Mod: ,,,

## 2023-12-03 PROCEDURE — 96360 HYDRATION IV INFUSION INIT: CPT

## 2023-12-03 PROCEDURE — 25000003 PHARM REV CODE 250

## 2023-12-03 PROCEDURE — 85025 COMPLETE CBC W/AUTO DIFF WBC: CPT

## 2023-12-03 PROCEDURE — 99284 EMERGENCY DEPT VISIT MOD MDM: CPT | Mod: ,,,

## 2023-12-03 PROCEDURE — 80053 COMPREHEN METABOLIC PANEL: CPT

## 2023-12-03 PROCEDURE — 99284 EMERGENCY DEPT VISIT MOD MDM: CPT | Mod: 25

## 2023-12-03 RX ORDER — CEFDINIR 300 MG/1
300 CAPSULE ORAL 2 TIMES DAILY
Qty: 14 CAPSULE | Refills: 0 | Status: SHIPPED | OUTPATIENT
Start: 2023-12-03 | End: 2023-12-10

## 2023-12-03 RX ADMIN — SODIUM CHLORIDE 1000 ML: 9 INJECTION, SOLUTION INTRAVENOUS at 01:12

## 2023-12-03 NOTE — ED TRIAGE NOTES
Presents to ED for complaints of body aches, cough and bilateral ear pain.  Patient was diagnosed with Covid on 11/19/23 and states that she just hasn't gotten over it.

## 2023-12-03 NOTE — ED PROVIDER NOTES
Encounter Date: 12/3/2023       History     Chief Complaint   Patient presents with    Generalized Body Aches    Cough    Otalgia     37 yo female with c/o headache, sinus congestion, bilateral ear pain. Reports being diagnosed with Covid on 11/19/23. States symptoms have been lingering since then. She complains of cough with mucous production. Denies any fever.     The history is provided by the patient.     Review of patient's allergies indicates:   Allergen Reactions    Arava [leflunomide]     Enbrel [etanercept]     Latex Other (See Comments)    Penicillins     Levofloxacin Rash     Past Medical History:   Diagnosis Date    Allergic rhinitis 11/30/2022    Cardiomyopathy 11/22/2022    Insomnia 8/31/2022    Intracranial aneurysm 2 mm evaluated by Dr. Badillo 2021 5/12/2021    Formatting of this note might be different from the original. Very small, left cavernous, 2 mm + Family history, aunt on dad's side    MDD (major depressive disorder) 8/31/2022    Migraine 11/22/2022    Osteoarthritis 1/26/2022    PTSD (post-traumatic stress disorder) 8/31/2022    Restless legs 3/1/2022    Sleep apnea 4/6/2021     Past Surgical History:   Procedure Laterality Date    HYSTERECTOMY      LAPAROTOMY       Family History   Problem Relation Age of Onset    Diabetes Mother     Bipolar disorder Mother     Hypertension Mother     Bipolar disorder Father     Bipolar disorder Sister     Diabetes Sister     Hypertension Sister     Seizures Brother     Bipolar disorder Brother     Hypertension Brother     Stroke Maternal Grandmother     Cancer Maternal Grandmother      Social History     Tobacco Use    Smoking status: Every Day     Current packs/day: 1.50     Average packs/day: 1.5 packs/day for 21.9 years (32.9 ttl pk-yrs)     Types: Cigarettes     Start date: 2002    Smokeless tobacco: Never    Tobacco comments:     5 cigs a day   Substance Use Topics    Alcohol use: Never    Drug use: Never     Review of Systems   Constitutional:   Negative for fever.   HENT:  Positive for congestion, ear pain, rhinorrhea, sinus pressure, sinus pain and sore throat. Negative for trouble swallowing.    Respiratory:  Positive for cough. Negative for shortness of breath.    Cardiovascular:  Negative for chest pain.   Gastrointestinal:  Negative for abdominal pain, nausea and vomiting.       Physical Exam     Initial Vitals [12/03/23 1258]   BP Pulse Resp Temp SpO2   110/76 (!) 137 18 98.9 °F (37.2 °C) 98 %      MAP       --         Physical Exam    Constitutional: She appears well-developed and well-nourished.   Eyes: Pupils are equal, round, and reactive to light.   Cardiovascular:  Regular rhythm and normal heart sounds.   Tachycardia present.         Pulmonary/Chest: Breath sounds normal. No respiratory distress. She has no wheezes. She has no rales.   Abdominal: Abdomen is soft. Bowel sounds are normal. There is no abdominal tenderness.   Musculoskeletal:         General: Normal range of motion.     Neurological: She is alert and oriented to person, place, and time.   Skin: Skin is warm and dry.         Medical Screening Exam   See Full Note    ED Course   Procedures  Labs Reviewed   COMPREHENSIVE METABOLIC PANEL - Abnormal; Notable for the following components:       Result Value    Total Protein 8.4 (*)     Albumin 3.1 (*)     Globulin 5.3 (*)     Alk Phos 165 (*)     All other components within normal limits   CBC WITH DIFFERENTIAL - Abnormal; Notable for the following components:    WBC 11.53 (*)     RBC 4.05 (*)     MCHC 31.7 (*)     RDW 17.2 (*)     Platelet Count 454 (*)     MPV 8.6 (*)     Neutrophils % 67.8 (*)     Lymphocytes % 22.5 (*)     Monocytes % 6.8 (*)     Immature Granulocytes % 0.9 (*)     Neutrophils, Abs 7.82 (*)     Immature Granulocytes, Absolute 0.10 (*)     All other components within normal limits   CBC W/ AUTO DIFFERENTIAL    Narrative:     The following orders were created for panel order CBC auto differential.  Procedure                                Abnormality         Status                     ---------                               -----------         ------                     CBC with Differential[[id number]]       Abnormal            Final result                 Please view results for these tests on the individual orders.          Imaging Results              X-Ray Chest PA And Lateral (Final result)  Result time 12/03/23 13:25:42      Final result by Jasper Schreiber DO (12/03/23 13:25:42)                   Impression:      No acute cardiopulmonary process demonstrated.    Point of Service: [facility]      Electronically signed by: Jasper Schreiber  Date:    12/03/2023  Time:    13:25               Narrative:    EXAMINATION:  XR CHEST PA AND LATERAL    CLINICAL HISTORY:  Cough, unspecified    COMPARISON:  Chest x-ray November 19, 2023    TECHNIQUE:  Frontal and lateral views of the chest.    FINDINGS:  Heart size appears within normal limits.  No focal consolidation, pleural effusion, or pneumothorax.  Visualized osseous and surrounding soft tissue structures demonstrate no acute abnormality.                                       Medications   sodium chloride 0.9% bolus 1,000 mL 1,000 mL (0 mLs Intravenous Stopped 12/3/23 1445)     Medical Decision Making  37 yo female with c/o headache, sinus congestion, bilateral ear pain. Reports being diagnosed with Covid on 11/19/23. States symptoms have been lingering since then. She complains of cough with mucous production. Denies any fever. Labs ok today. Xray chest unremarkable. Rx for Cefdinir. Follow-up with PCP.       Amount and/or Complexity of Data Reviewed  Labs: ordered.  Radiology: ordered.    Risk  Prescription drug management.                                      Clinical Impression:   Final diagnoses:  [R05.9] Cough  [J01.90] Acute sinusitis, recurrence not specified, unspecified location (Primary)  [H66.91] Right otitis media, unspecified otitis media type        ED  Disposition Condition    Discharge Stable          ED Prescriptions       Medication Sig Dispense Start Date End Date Auth. Provider    cefdinir (OMNICEF) 300 MG capsule Take 1 capsule (300 mg total) by mouth 2 (two) times daily. for 7 days 14 capsule 12/3/2023 12/10/2023 Leonarda Crawley FNP          Follow-up Information       Follow up With Specialties Details Why Contact Info    Lesa Peterson DO Family Medicine In 2 days  [address]  [facility] [id number] [phone]               Leonarda Crawley FNP  12/03/23 [id number]

## 2023-12-03 NOTE — DISCHARGE INSTRUCTIONS
Take antibiotic as prescribed. Follow-up with your primary care provider. Return to Emergency Department for any new or worsening symptoms.

## 2023-12-04 ENCOUNTER — PATIENT OUTREACH (OUTPATIENT)
Dept: EMERGENCY MEDICINE | Facility: HOSPITAL | Age: 38
End: 2023-12-04
Payer: MEDICARE

## 2023-12-04 NOTE — PROGRESS NOTES
ED Navigator attempted to contact patient to assist with scheduling a post ED 7-day follow up with PCP. Phone number is not working, patient is unable to reach. ED Navigator to close encounter at this time.

## 2023-12-09 DIAGNOSIS — Z71.89 COMPLEX CARE COORDINATION: ICD-10-CM

## 2024-02-05 ENCOUNTER — HOSPITAL ENCOUNTER (EMERGENCY)
Facility: HOSPITAL | Age: 39
Discharge: HOME | End: 2024-02-05
Payer: MEDICARE

## 2024-02-05 VITALS
HEIGHT: 63 IN | SYSTOLIC BLOOD PRESSURE: 106 MMHG | TEMPERATURE: 99 F | OXYGEN SATURATION: 96 % | WEIGHT: 100 LBS | RESPIRATION RATE: 20 BRPM | HEART RATE: 121 BPM | BODY MASS INDEX: 17.72 KG/M2 | DIASTOLIC BLOOD PRESSURE: 73 MMHG

## 2024-02-05 DIAGNOSIS — S22.080A T12 COMPRESSION FRACTURE, INITIAL ENCOUNTER: Primary | ICD-10-CM

## 2024-02-05 PROCEDURE — 99284 EMERGENCY DEPT VISIT MOD MDM: CPT | Mod: 25

## 2024-02-05 PROCEDURE — 63600175 PHARM REV CODE 636 W HCPCS: Performed by: NURSE PRACTITIONER

## 2024-02-05 PROCEDURE — 99284 EMERGENCY DEPT VISIT MOD MDM: CPT | Mod: ,,, | Performed by: NURSE PRACTITIONER

## 2024-02-05 PROCEDURE — 96372 THER/PROPH/DIAG INJ SC/IM: CPT | Performed by: NURSE PRACTITIONER

## 2024-02-05 PROCEDURE — 25000003 PHARM REV CODE 250: Performed by: NURSE PRACTITIONER

## 2024-02-05 RX ORDER — METHOCARBAMOL 500 MG/1
500 TABLET, FILM COATED ORAL
Status: COMPLETED | OUTPATIENT
Start: 2024-02-05 | End: 2024-02-05

## 2024-02-05 RX ORDER — MORPHINE SULFATE 4 MG/ML
4 INJECTION, SOLUTION INTRAMUSCULAR; INTRAVENOUS
Status: COMPLETED | OUTPATIENT
Start: 2024-02-05 | End: 2024-02-05

## 2024-02-05 RX ORDER — HYDROCODONE BITARTRATE AND ACETAMINOPHEN 7.5; 325 MG/1; MG/1
1 TABLET ORAL EVERY 6 HOURS PRN
Qty: 12 TABLET | Refills: 0 | Status: SHIPPED | OUTPATIENT
Start: 2024-02-05

## 2024-02-05 RX ORDER — ONDANSETRON HYDROCHLORIDE 2 MG/ML
4 INJECTION, SOLUTION INTRAVENOUS
Status: COMPLETED | OUTPATIENT
Start: 2024-02-05 | End: 2024-02-05

## 2024-02-05 RX ORDER — IBUPROFEN 800 MG/1
800 TABLET ORAL 3 TIMES DAILY
Qty: 20 TABLET | Refills: 0 | Status: SHIPPED | OUTPATIENT
Start: 2024-02-05

## 2024-02-05 RX ORDER — TIZANIDINE 4 MG/1
4 TABLET ORAL EVERY 6 HOURS PRN
Qty: 20 TABLET | Refills: 0 | Status: SHIPPED | OUTPATIENT
Start: 2024-02-05 | End: 2024-02-15

## 2024-02-05 RX ORDER — ONDANSETRON HYDROCHLORIDE 2 MG/ML
8 INJECTION, SOLUTION INTRAVENOUS
Status: DISCONTINUED | OUTPATIENT
Start: 2024-02-05 | End: 2024-02-05

## 2024-02-05 RX ADMIN — MORPHINE SULFATE 4 MG: 4 INJECTION, SOLUTION INTRAMUSCULAR; INTRAVENOUS at 03:02

## 2024-02-05 RX ADMIN — METHOCARBAMOL 500 MG: 500 TABLET ORAL at 03:02

## 2024-02-05 RX ADMIN — ONDANSETRON 4 MG: 2 INJECTION INTRAMUSCULAR; INTRAVENOUS at 03:02

## 2024-02-05 NOTE — DISCHARGE INSTRUCTIONS
Wear TLSO brace until follow up with [facility].  Take medications as prescribed.  Alternate heat and ice compresses for comfort.  Return to the ER with new or worsening symptoms.

## 2024-02-05 NOTE — ED TRIAGE NOTES
Pt presents to ed via ems, c/o fall a couple weeks ago, and a fall today also. C/o left foot hurt and her spine hurting.

## 2024-02-05 NOTE — ED PROVIDER NOTES
Encounter Date: 2/5/2024       History     Chief Complaint   Patient presents with    Back Pain       Patient presents to the ER by EMS.  She states she fell walking from the bathroom into the hallway.  She states she fell onto her bottom and landed on her back.  She states she was severe pain in her lower back when this occurred.  She states her legs were numb for few minutes before she was able to get up and move.  She states she was to call an ambulance to come get her bring her to the emergency room.  She denies hitting her head.  She denies LOC. She denies neck or extremity pain.  Patient was not lost control of her bowels or bladder.    The history is provided by the patient and the EMS personnel. No  was used.     Review of patient's allergies indicates:   Allergen Reactions    Arava [leflunomide]     Enbrel [etanercept]     Latex Other (See Comments)    Penicillins     Levofloxacin Rash     Past Medical History:   Diagnosis Date    Allergic rhinitis 11/30/2022    Cardiomyopathy 11/22/2022    Insomnia 8/31/2022    Intracranial aneurysm 2 mm evaluated by Dr. Badillo 2021 5/12/2021    Formatting of this note might be different from the original. Very small, left cavernous, 2 mm + Family history, aunt on dad's side    MDD (major depressive disorder) 8/31/2022    Migraine 11/22/2022    Osteoarthritis 1/26/2022    PTSD (post-traumatic stress disorder) 8/31/2022    Restless legs 3/1/2022    Sleep apnea 4/6/2021     Past Surgical History:   Procedure Laterality Date    HYSTERECTOMY      LAPAROTOMY       Family History   Problem Relation Age of Onset    Diabetes Mother     Bipolar disorder Mother     Hypertension Mother     Bipolar disorder Father     Bipolar disorder Sister     Diabetes Sister     Hypertension Sister     Seizures Brother     Bipolar disorder Brother     Hypertension Brother     Stroke Maternal Grandmother     Cancer Maternal Grandmother      Social History     Tobacco Use     Smoking status: Every Day     Current packs/day: 1.50     Average packs/day: 1.5 packs/day for 22.1 years (33.1 ttl pk-yrs)     Types: Cigarettes     Start date: 2002    Smokeless tobacco: Never    Tobacco comments:     5 cigs a day   Substance Use Topics    Alcohol use: Never    Drug use: Never     Review of Systems   Constitutional:  Positive for activity change and fatigue.   Musculoskeletal:  Positive for back pain and myalgias.   All other systems reviewed and are negative.      Physical Exam     Initial Vitals [02/05/24 1350]   BP Pulse Resp Temp SpO2   106/73 (!) 121 20 98.5 °F (36.9 °C) 96 %      MAP       --         Physical Exam    Nursing note and vitals reviewed.  Constitutional: She appears well-developed and well-nourished.   HENT:   Head: Normocephalic.   Nose: Nose normal.   Mouth/Throat: Oropharynx is clear and moist.   Eyes: EOM are normal.   Neck:   Normal range of motion.  Cardiovascular:  Normal rate, normal heart sounds and intact distal pulses.           Pulmonary/Chest: Breath sounds normal.   Abdominal: Abdomen is soft. Bowel sounds are normal.   Musculoskeletal:         General: Tenderness present.      Cervical back: Normal range of motion.      Comments:   Tenderness of lumbar spine.     Neurological: She is alert and oriented to person, place, and time. She has normal strength. GCS score is 15. GCS eye subscore is 4. GCS verbal subscore is 5. GCS motor subscore is 6.   Skin: Skin is warm and dry. Capillary refill takes less than 2 seconds.   Psychiatric: She has a normal mood and affect. Her behavior is normal. Judgment and thought content normal.         Medical Screening Exam   See Full Note    ED Course   Procedures  Labs Reviewed - No data to display       Imaging Results              X-Ray Lumbar Spine Ap And Lateral (Final result)  Result time 02/05/24 14:27:32      Final result by Rick Carbajal MD (02/05/24 14:27:32)                   Impression:      T12 superior endplate  compression fracture.      Electronically signed by: Rick Carbajal  Date:    02/05/2024  Time:    14:27               Narrative:    EXAMINATION:  XR LUMBAR SPINE AP AND LATERAL    CLINICAL HISTORY:  fall;    TECHNIQUE:  AP, lateral and spot images were performed of the lumbar spine.    COMPARISON:  06/21/2023    FINDINGS:  There is a superior endplate compression fracture of the T12 vertebral body.  Note made that the T12 vertebra has small ribs.  There also appears to be sacralization of the L5 vertebral body similar to prior.  The superior endplate compression fracture of what is labeled T12 on this exam has 30% anterior endplate height loss.  No retropulsion detected.  This is a new finding since lumbar spine radiograph 06/21/2023 and was also not present on chest radiograph from 12/03/2023 which do contain lateral view.  No other endplate deformity seen in the lumbar spine are other fracture detected.                                       Medications   morphine injection 4 mg (4 mg Intramuscular Given 2/5/24 1512)   methocarbamoL tablet 500 mg (500 mg Oral Given 2/5/24 1512)   ondansetron injection 4 mg (4 mg Intramuscular Given 2/5/24 1511)     Medical Decision Making  Patient presents to the ER by EMS.  She states she fell walking from the bathroom into the hallway.  She states she fell onto her bottom and landed on her back.  She states she was severe pain in her lower back when this occurred.  She states her legs were numb for few minutes before she was able to get up and move.  She states she was to call an ambulance to come get her bring her to the emergency room.  She denies hitting her head.  She denies LOC. She denies neck or extremity pain.  Patient was not lost control of her bowels or bladder.      Amount and/or Complexity of Data Reviewed  Radiology: ordered. Decision-making details documented in ED Course.     Details:   Lumbar spine x-ray T12 compression fracture  Discussion of management or test  interpretation with external provider(s):   Morphine 4 mg IM   Zofran 4 mg IM   Robaxin 500 mg p.o.   TLSO brace to be placed in  ER.    Patient was discharged home with diagnosis of T12 compression fracture with fall.  Patient was given prescription for Norco, tizanidine and ibuprofen to take as prescribed.  She was instructed to wear a TLSO brace until follow up with Dr. Katz in ortho spine clinic.  She was instructed to call Dr. Katz's office to schedule follow up appointment.  Patient verbalizes understanding agrees with plan of care.    Risk  Prescription drug management.                                      Clinical Impression:   Final diagnoses:  [S22.080A] T12 compression fracture, initial encounter (Primary)        ED Disposition Condition    Discharge Stable          ED Prescriptions       Medication Sig Dispense Start Date End Date Auth. Provider    HYDROcodone-acetaminophen (NORCO) 7.5-325 mg per tablet Take 1 tablet by mouth every 6 (six) hours as needed for Pain. 12 tablet 2/5/2024 -- Brenda Graham FNP    tiZANidine (ZANAFLEX) 4 MG tablet Take 1 tablet (4 mg total) by mouth every 6 (six) hours as needed (muscle spasm). 20 tablet 2/5/2024 2/15/2024 Brenda Graham FNP    ibuprofen (ADVIL,MOTRIN) 800 MG tablet Take 1 tablet (800 mg total) by mouth 3 (three) times daily. 20 tablet 2/5/2024 -- Brenda Graham FNP          Follow-up Information       Follow up With Specialties Details Why Contact Info    Zurdo Katz MD Orthopedic Surgery, Spine Surgery Schedule an appointment as soon as possible for a visit   [address] [place] [address] [facility] [address]  [phone]               Brenda Graham FNP  02/06/24 0022

## 2024-02-26 DIAGNOSIS — S22.009A CLOSED FRACTURE OF THORACIC VERTEBRA, UNSPECIFIED FRACTURE MORPHOLOGY, UNSPECIFIED THORACIC VERTEBRAL LEVEL, INITIAL ENCOUNTER: Primary | ICD-10-CM

## 2024-02-27 ENCOUNTER — HOSPITAL ENCOUNTER (EMERGENCY)
Facility: HOSPITAL | Age: 39
Discharge: HOME | End: 2024-02-27
Attending: EMERGENCY MEDICINE
Payer: MEDICARE

## 2024-02-27 VITALS
OXYGEN SATURATION: 99 % | HEART RATE: 81 BPM | HEIGHT: 63 IN | DIASTOLIC BLOOD PRESSURE: 68 MMHG | TEMPERATURE: 98 F | WEIGHT: 112 LBS | SYSTOLIC BLOOD PRESSURE: 101 MMHG | RESPIRATION RATE: 18 BRPM | BODY MASS INDEX: 19.84 KG/M2

## 2024-02-27 DIAGNOSIS — J40 BRONCHITIS: Primary | ICD-10-CM

## 2024-02-27 DIAGNOSIS — R09.89 CHEST CONGESTION: ICD-10-CM

## 2024-02-27 DIAGNOSIS — R05.9 COUGH: ICD-10-CM

## 2024-02-27 LAB
ANISOCYTOSIS BLD QL SMEAR: ABNORMAL
ATYPICAL LYMPHOCYTES: ABNORMAL
BASOPHILS # BLD AUTO: 0.04 K/UL (ref 0–0.2)
BASOPHILS NFR BLD AUTO: 0.5 % (ref 0–1)
DIFFERENTIAL METHOD BLD: ABNORMAL
EOSINOPHIL # BLD AUTO: 0.32 K/UL (ref 0–0.5)
EOSINOPHIL NFR BLD AUTO: 3.7 % (ref 1–4)
EOSINOPHIL NFR BLD MANUAL: 8 % (ref 1–4)
ERYTHROCYTE [DISTWIDTH] IN BLOOD BY AUTOMATED COUNT: 14.8 % (ref 11.5–14.5)
HCT VFR BLD AUTO: 33 % (ref 38–47)
HGB BLD-MCNC: 10.9 G/DL (ref 12–16)
HYPOCHROMIA BLD QL SMEAR: ABNORMAL
IMM GRANULOCYTES # BLD AUTO: 0.03 K/UL (ref 0–0.04)
IMM GRANULOCYTES NFR BLD: 0.3 % (ref 0–0.4)
LYMPHOCYTES # BLD AUTO: 2.61 K/UL (ref 1–4.8)
LYMPHOCYTES NFR BLD AUTO: 29.8 % (ref 27–41)
LYMPHOCYTES NFR BLD MANUAL: 30 % (ref 27–41)
MCH RBC QN AUTO: 28.6 PG (ref 27–31)
MCHC RBC AUTO-ENTMCNC: 33 G/DL (ref 32–36)
MCV RBC AUTO: 86.6 FL (ref 80–96)
MONOCYTES # BLD AUTO: 0.8 K/UL (ref 0–0.8)
MONOCYTES NFR BLD AUTO: 9.1 % (ref 2–6)
MONOCYTES NFR BLD MANUAL: 10 % (ref 2–6)
MPC BLD CALC-MCNC: 8.6 FL (ref 9.4–12.4)
NEUTROPHILS # BLD AUTO: 4.95 K/UL (ref 1.8–7.7)
NEUTROPHILS NFR BLD AUTO: 56.6 % (ref 53–65)
NEUTS BAND NFR BLD MANUAL: 1 % (ref 1–5)
NEUTS SEG NFR BLD MANUAL: 51 % (ref 50–62)
NRBC # BLD AUTO: 0 X10E3/UL
NRBC, AUTO (.00): 0 %
PLATELET # BLD AUTO: 332 K/UL (ref 150–400)
PLATELET MORPHOLOGY: ABNORMAL
POLYCHROMASIA BLD QL SMEAR: ABNORMAL
RBC # BLD AUTO: 3.81 M/UL (ref 4.2–5.4)
SMUDGE CELLS BLD QL SMEAR: ABNORMAL
WBC # BLD AUTO: 8.75 K/UL (ref 4.5–11)

## 2024-02-27 PROCEDURE — 96372 THER/PROPH/DIAG INJ SC/IM: CPT | Performed by: EMERGENCY MEDICINE

## 2024-02-27 PROCEDURE — 85025 COMPLETE CBC W/AUTO DIFF WBC: CPT | Performed by: EMERGENCY MEDICINE

## 2024-02-27 PROCEDURE — 93005 ELECTROCARDIOGRAM TRACING: CPT

## 2024-02-27 PROCEDURE — 99284 EMERGENCY DEPT VISIT MOD MDM: CPT | Mod: ,,, | Performed by: EMERGENCY MEDICINE

## 2024-02-27 PROCEDURE — 99285 EMERGENCY DEPT VISIT HI MDM: CPT | Mod: 25

## 2024-02-27 PROCEDURE — 63600175 PHARM REV CODE 636 W HCPCS: Performed by: EMERGENCY MEDICINE

## 2024-02-27 PROCEDURE — 93010 ELECTROCARDIOGRAM REPORT: CPT | Mod: ,,, | Performed by: HOSPITALIST

## 2024-02-27 RX ORDER — DEXAMETHASONE SODIUM PHOSPHATE 4 MG/ML
8 INJECTION, SOLUTION INTRA-ARTICULAR; INTRALESIONAL; INTRAMUSCULAR; INTRAVENOUS; SOFT TISSUE
Status: DISCONTINUED | OUTPATIENT
Start: 2024-02-27 | End: 2024-02-27 | Stop reason: HOSPADM

## 2024-02-27 RX ORDER — CEFTRIAXONE 1 G/1
1 INJECTION, POWDER, FOR SOLUTION INTRAMUSCULAR; INTRAVENOUS
Status: COMPLETED | OUTPATIENT
Start: 2024-02-27 | End: 2024-02-27

## 2024-02-27 RX ORDER — PREDNISONE 20 MG/1
TABLET ORAL
Qty: 12 TABLET | Refills: 0 | Status: SHIPPED | OUTPATIENT
Start: 2024-02-27

## 2024-02-27 RX ORDER — AZITHROMYCIN 500 MG/1
500 TABLET, FILM COATED ORAL DAILY
Qty: 3 TABLET | Refills: 0 | Status: SHIPPED | OUTPATIENT
Start: 2024-02-27 | End: 2024-03-01

## 2024-02-27 RX ADMIN — CEFTRIAXONE 1 G: 1 INJECTION, POWDER, FOR SOLUTION INTRAMUSCULAR; INTRAVENOUS at 06:02

## 2024-02-27 NOTE — ED NOTES
"Patient did not tolerate rocephin shot well. No signs of reactions. Patient refused steroid shot. Nurse stated, " I'll give you a minute to relax and will bring your discharge papers later." Time: 0647- patient seen walking out of ER. Patient did not receive discharge papers.   "

## 2024-02-27 NOTE — ED PROVIDER NOTES
Encounter Date: 2/27/2024       History     Chief Complaint   Patient presents with    Cough    Chest Congestion     39 Y/O FEMALE WITH COUGH X 3+ WEEKS.   SHE ALSO REPORTS NASAL / SINUS CONGESTION AND ANTERIOR CHEST WALL PAIN THAT IS WORSE WITH PALPATION AND WITH DEEP INSPIRATION.  SHE NOTES NO PARTICULAR REMITTING OR EXACERBATING FACTORS.  SHE SAYS SHE USES MEDICAL MARIJUANA AND SMOKES.  SHE DENIES STREET DRUG USE.  SHE NOTES NO PARTICULAR REMITTING OR EXACERBATING FACTORS.        Review of patient's allergies indicates:   Allergen Reactions    Arava [leflunomide]     Enbrel [etanercept]     Latex Other (See Comments)    Penicillins     Levofloxacin Rash     Past Medical History:   Diagnosis Date    Allergic rhinitis 11/30/2022    Cardiomyopathy 11/22/2022    Insomnia 8/31/2022    Intracranial aneurysm 2 mm evaluated by Dr. Badillo 2021 5/12/2021    Formatting of this note might be different from the original. Very small, left cavernous, 2 mm + Family history, aunt on dad's side    MDD (major depressive disorder) 8/31/2022    Migraine 11/22/2022    Osteoarthritis 1/26/2022    PTSD (post-traumatic stress disorder) 8/31/2022    Restless legs 3/1/2022    Sleep apnea 4/6/2021     Past Surgical History:   Procedure Laterality Date    HYSTERECTOMY      LAPAROTOMY       Family History   Problem Relation Age of Onset    Diabetes Mother     Bipolar disorder Mother     Hypertension Mother     Bipolar disorder Father     Bipolar disorder Sister     Diabetes Sister     Hypertension Sister     Seizures Brother     Bipolar disorder Brother     Hypertension Brother     Stroke Maternal Grandmother     Cancer Maternal Grandmother      Social History     Tobacco Use    Smoking status: Every Day     Current packs/day: 1.50     Average packs/day: 1.5 packs/day for 22.2 years (33.2 ttl pk-yrs)     Types: Cigarettes     Start date: 2002    Smokeless tobacco: Never    Tobacco comments:     5 cigs a day   Substance Use Topics    Alcohol  use: Never    Drug use: Yes     Types: Marijuana     Comment: pt states medical elise     Review of Systems   All other systems reviewed and are negative.      Physical Exam     Initial Vitals [02/27/24 0459]   BP Pulse Resp Temp SpO2   101/68 81 18 98 °F (36.7 °C) 99 %      MAP       --         Physical Exam    Nursing note and vitals reviewed.  Constitutional: She appears well-developed and well-nourished.   HENT:   Head: Normocephalic and atraumatic.   NASAL MUCOSAL EDEMA AND POST-NASAL DRAINAGE.     Eyes: Conjunctivae and EOM are normal. Pupils are equal, round, and reactive to light.   Neck: Neck supple.   Normal range of motion.  Cardiovascular:  Normal rate, regular rhythm and normal heart sounds.           Pulmonary/Chest: Breath sounds normal.   Abdominal: Abdomen is soft. Bowel sounds are normal.   Musculoskeletal:         General: Normal range of motion.      Cervical back: Normal range of motion and neck supple.     Neurological: She is alert and oriented to person, place, and time. She has normal strength. GCS score is 15. GCS eye subscore is 4. GCS verbal subscore is 5. GCS motor subscore is 6.   Skin: Skin is warm and dry. Capillary refill takes less than 2 seconds.   Psychiatric: She has a normal mood and affect.         Medical Screening Exam   See Full Note    ED Course   Procedures  Labs Reviewed   CBC W/ AUTO DIFFERENTIAL    Narrative:     The following orders were created for panel order CBC auto differential.  Procedure                               Abnormality         Status                     ---------                               -----------         ------                     CBC with Differential[[id number]]                           In process                 Manual Differential[[id number]]                             In process                   Please view results for these tests on the individual orders.   CBC WITH DIFFERENTIAL   MANUAL DIFFERENTIAL          Imaging Results               X-Ray Chest 1 View (In process)  Result time 02/27/24 05:48:17   Procedure changed from X-Ray Chest AP Portable                    Medications   dexAMETHasone injection 8 mg (has no administration in time range)   cefTRIAXone injection 1 g (has no administration in time range)     Medical Decision Making  Amount and/or Complexity of Data Reviewed  Labs: ordered.  Radiology: ordered.    Risk  Prescription drug management.                                      Clinical Impression:   Final diagnoses:  [R09.89] Chest congestion  [R05.9] Cough  [J40] Bronchitis (Primary)        ED Disposition Condition    Discharge Stable          ED Prescriptions       Medication Sig Dispense Start Date End Date Auth. Provider    azithromycin (ZITHROMAX) 500 MG tablet Take 1 tablet (500 mg total) by mouth once daily. for 3 days 3 tablet 2/27/2024 3/1/2024 Florencio Baldwin MD    predniSONE (DELTASONE) 20 MG tablet 3 TABLETS PO DAILY X 2 DAYS, 2 TABLETS PO DAILY X 2 DAYS, THEN 1 TABLET PO DAILY X 2 DAYS, THEN STOP. 12 tablet 2/27/2024 -- Florencio Baldwin MD          Follow-up Information       Follow up With Specialties Details Why Contact Info    Lesa Peterson, DO Family Medicine  As needed [address]  [facility] [place] [address]  [phone]               Florencio Baldwin MD  02/27/24 0648

## 2024-02-27 NOTE — DISCHARGE INSTRUCTIONS
TAKE PREDNISONE AND AZITHROMYCIN AS DIRECTED.  DRINK PLENTY OF FLUIDS.  FOLLOW UP IF SYMPTOMS PERSIST OR WORSEN OR OTHERWISE AS NEEDED.

## 2024-02-27 NOTE — ED NOTES
Patient left without receiving prescription for Azithromycin, sent to [facility].

## 2024-02-28 LAB
OHS QRS DURATION: 78 MS
OHS QTC CALCULATION: 471 MS

## 2024-06-21 ENCOUNTER — HOSPITAL ENCOUNTER (EMERGENCY)
Facility: HOSPITAL | Age: 39
Discharge: HOME | End: 2024-06-21
Payer: MEDICARE

## 2024-06-21 VITALS
DIASTOLIC BLOOD PRESSURE: 75 MMHG | HEIGHT: 63 IN | BODY MASS INDEX: 17.89 KG/M2 | WEIGHT: 101 LBS | RESPIRATION RATE: 16 BRPM | HEART RATE: 113 BPM | TEMPERATURE: 98 F | SYSTOLIC BLOOD PRESSURE: 117 MMHG | OXYGEN SATURATION: 98 %
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
## 2024-06-21 PROCEDURE — 96372 THER/PROPH/DIAG INJ SC/IM: CPT

## 2024-06-21 PROCEDURE — 63600175 PHARM REV CODE 636 W HCPCS

## 2024-06-21 PROCEDURE — 99284 EMERGENCY DEPT VISIT MOD MDM: CPT | Mod: 25

## 2024-06-21 RX ORDER — KETOROLAC TROMETHAMINE 30 MG/ML
30 INJECTION, SOLUTION INTRAMUSCULAR; INTRAVENOUS
Status: COMPLETED | OUTPATIENT
Start: 2024-06-21 | End: 2024-06-21

## 2024-06-21 RX ADMIN — KETOROLAC TROMETHAMINE 30 MG: 30 INJECTION, SOLUTION INTRAMUSCULAR at 05:06
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
## 2024-06-22 NOTE — DISCHARGE INSTRUCTIONS
Take medication as ordered.  Follow up with primary care doctor in 2 days.  Return to the emergency department for new or worsening symptoms

## 2024-06-26 ENCOUNTER — HOSPITAL ENCOUNTER (EMERGENCY)
Facility: HOSPITAL | Age: 39
Discharge: STILL ADMITTED OR ADMINISTRATIVE | End: 2024-06-26
Payer: MEDICARE

## 2024-06-26 PROCEDURE — 99999 HC NO LEVEL OF SERVICE - ED ONLY: CPT

## 2024-09-10 ENCOUNTER — HOSPITAL ENCOUNTER (EMERGENCY)
Facility: HOSPITAL | Age: 39
Discharge: HOME | End: 2024-09-10
Attending: EMERGENCY MEDICINE
Payer: MEDICARE

## 2024-09-10 VITALS
RESPIRATION RATE: 24 BRPM | HEART RATE: 88 BPM | TEMPERATURE: 98 F | HEIGHT: 63 IN | BODY MASS INDEX: 18.07 KG/M2 | OXYGEN SATURATION: 98 % | WEIGHT: 102 LBS

## 2024-09-10 DIAGNOSIS — A60.00 GENITAL HERPES SIMPLEX, UNSPECIFIED SITE: ICD-10-CM

## 2024-09-10 DIAGNOSIS — R10.2 VAGINAL PAIN: Primary | ICD-10-CM

## 2024-09-10 PROCEDURE — 99283 EMERGENCY DEPT VISIT LOW MDM: CPT

## 2024-09-10 RX ORDER — ACYCLOVIR 400 MG/1
400 TABLET ORAL 3 TIMES DAILY
Qty: 30 TABLET | Refills: 0 | Status: SHIPPED | OUTPATIENT
Start: 2024-09-10 | End: 2024-09-20

## 2024-09-10 NOTE — ED PROVIDER NOTES
Encounter Date: 9/10/2024       History     Chief Complaint   Patient presents with    Vaginal Pain     Patient is a 38-year-old female with history of marijuana and methamphetamine abuse.  Patient presents to the emergency department this morning very anxious complaining of burning pain in her vagina which has been going on about 4 days.  No vaginal discharge, no dysuria, no vaginal trauma.  Patient denies any other acute problems or complaints.        Review of patient's allergies indicates:   Allergen Reactions    Arava [leflunomide]     Enbrel [etanercept]     Latex Other (See Comments)    Penicillins     Levofloxacin Rash     Past Medical History:   Diagnosis Date    Allergic rhinitis 11/30/2022    Cardiomyopathy 11/22/2022    Insomnia 8/31/2022    Intracranial aneurysm 2 mm evaluated by Dr. Badillo 2021 5/12/2021    Formatting of this note might be different from the original. Very small, left cavernous, 2 mm + Family history, aunt on dad's side    MDD (major depressive disorder) 8/31/2022    Migraine 11/22/2022    Osteoarthritis 1/26/2022    PTSD (post-traumatic stress disorder) 8/31/2022    Restless legs 3/1/2022    Sleep apnea 4/6/2021     Past Surgical History:   Procedure Laterality Date    HYSTERECTOMY      LAPAROTOMY       Family History   Problem Relation Name Age of Onset    Diabetes Mother      Bipolar disorder Mother      Hypertension Mother      Bipolar disorder Father      Bipolar disorder Sister      Diabetes Sister      Hypertension Sister      Seizures Brother      Bipolar disorder Brother      Hypertension Brother      Stroke Maternal Grandmother      Cancer Maternal Grandmother       Social History     Tobacco Use    Smoking status: Every Day     Current packs/day: 1.50     Average packs/day: 1.5 packs/day for 22.7 years (34.0 ttl pk-yrs)     Types: Cigarettes     Start date: 2002    Smokeless tobacco: Never    Tobacco comments:     5 cigs a day   Substance Use Topics    Alcohol use: Never     Drug use: Yes     Types: Marijuana     Comment: pt states medical elise     Review of Systems   Genitourinary:  Positive for vaginal pain.   All other systems reviewed and are negative.      Physical Exam     Initial Vitals [09/10/24 5355]   BP Pulse Resp Temp SpO2   -- 88 (!) 24 97.8 °F (36.6 °C) 98 %      MAP       --         Physical Exam    Nursing note and vitals reviewed.  Constitutional: She appears well-developed.   Patient is very thin, appears older than stated age.   HENT:   Head: Normocephalic.   Eyes: Pupils are equal, round, and reactive to light.   Cardiovascular:  Normal rate.           Pulmonary/Chest: Breath sounds normal.   Abdominal: Abdomen is soft.   Genitourinary:    Genitourinary Comments: Genitourinary exam reveals multiple ulcers of bilateral labia consistent with herpes.     Musculoskeletal:         General: Normal range of motion.     Neurological: She is alert.   Skin: Skin is warm. Capillary refill takes less than 2 seconds.   Psychiatric:   Patient is extremely anxious and restless.         Medical Screening Exam   See Full Note    ED Course   Procedures  Labs Reviewed - No data to display       Imaging Results    None          Medications - No data to display  Medical Decision Making                                    Clinical Impression:   Final diagnoses:  [R10.2] Vaginal pain (Primary)  [A60.00] Genital herpes simplex, unspecified site        ED Disposition Condition    Discharge Stable          ED Prescriptions       Medication Sig Dispense Start Date End Date Auth. Provider    acyclovir (ZOVIRAX) 400 MG tablet Take 1 tablet (400 mg total) by mouth 3 (three) times daily. for 10 days 30 tablet 9/10/2024 9/20/2024 Jean Paul Blanc MD          Follow-up Information    None          Jean Paul Blanc MD  09/10/24 5039

## 2024-09-10 NOTE — DISCHARGE INSTRUCTIONS
Take acyclovir as prescribed.  Follow up in clinic with primary care provider or health department in 2-3 days for recheck.  Return to emergency department for any worsening or further problems.  Take ibuprofen and Tylenol for pain.